# Patient Record
Sex: FEMALE | Race: WHITE | NOT HISPANIC OR LATINO
[De-identification: names, ages, dates, MRNs, and addresses within clinical notes are randomized per-mention and may not be internally consistent; named-entity substitution may affect disease eponyms.]

---

## 2017-08-01 ENCOUNTER — APPOINTMENT (OUTPATIENT)
Dept: THORACIC SURGERY | Facility: CLINIC | Age: 77
End: 2017-08-01
Payer: MEDICARE

## 2017-08-01 VITALS
DIASTOLIC BLOOD PRESSURE: 76 MMHG | RESPIRATION RATE: 16 BRPM | OXYGEN SATURATION: 98 % | BODY MASS INDEX: 28.7 KG/M2 | HEIGHT: 61 IN | WEIGHT: 152 LBS | SYSTOLIC BLOOD PRESSURE: 130 MMHG | HEART RATE: 68 BPM

## 2017-08-01 DIAGNOSIS — K21.9 GASTRO-ESOPHAGEAL REFLUX DISEASE W/OUT ESOPHAGITIS: ICD-10-CM

## 2017-08-01 DIAGNOSIS — Z87.898 PERSONAL HISTORY OF OTHER SPECIFIED CONDITIONS: ICD-10-CM

## 2017-08-01 DIAGNOSIS — Z87.2 PERSONAL HISTORY OF DISEASES OF THE SKIN AND SUBCUTANEOUS TISSUE: ICD-10-CM

## 2017-08-01 DIAGNOSIS — I10 ESSENTIAL (PRIMARY) HYPERTENSION: ICD-10-CM

## 2017-08-01 DIAGNOSIS — Z82.49 FAMILY HISTORY OF ISCHEMIC HEART DISEASE AND OTHER DISEASES OF THE CIRCULATORY SYSTEM: ICD-10-CM

## 2017-08-01 PROCEDURE — 99205 OFFICE O/P NEW HI 60 MIN: CPT

## 2017-08-07 ENCOUNTER — OUTPATIENT (OUTPATIENT)
Dept: OUTPATIENT SERVICES | Facility: HOSPITAL | Age: 77
LOS: 1 days | End: 2017-08-07
Payer: MEDICARE

## 2017-08-07 ENCOUNTER — APPOINTMENT (OUTPATIENT)
Dept: CT IMAGING | Facility: IMAGING CENTER | Age: 77
End: 2017-08-07
Payer: MEDICARE

## 2017-08-07 DIAGNOSIS — R91.8 OTHER NONSPECIFIC ABNORMAL FINDING OF LUNG FIELD: ICD-10-CM

## 2017-08-07 PROCEDURE — 71250 CT THORAX DX C-: CPT | Mod: 26

## 2017-08-07 PROCEDURE — 71250 CT THORAX DX C-: CPT

## 2017-08-09 ENCOUNTER — TRANSCRIPTION ENCOUNTER (OUTPATIENT)
Age: 77
End: 2017-08-09

## 2017-08-15 ENCOUNTER — APPOINTMENT (OUTPATIENT)
Dept: THORACIC SURGERY | Facility: CLINIC | Age: 77
End: 2017-08-15
Payer: MEDICARE

## 2017-08-15 DIAGNOSIS — R91.8 OTHER NONSPECIFIC ABNORMAL FINDING OF LUNG FIELD: ICD-10-CM

## 2017-08-15 PROCEDURE — 99214 OFFICE O/P EST MOD 30 MIN: CPT

## 2017-08-16 VITALS
DIASTOLIC BLOOD PRESSURE: 60 MMHG | SYSTOLIC BLOOD PRESSURE: 140 MMHG | BODY MASS INDEX: 28.32 KG/M2 | OXYGEN SATURATION: 98 % | HEART RATE: 57 BPM | WEIGHT: 150 LBS | RESPIRATION RATE: 14 BRPM | HEIGHT: 61 IN

## 2017-08-16 PROBLEM — R91.8 LUNG NODULES: Status: ACTIVE | Noted: 2017-08-01

## 2019-03-24 ENCOUNTER — EMERGENCY (EMERGENCY)
Facility: HOSPITAL | Age: 79
LOS: 1 days | Discharge: HOME | End: 2019-03-24
Attending: EMERGENCY MEDICINE | Admitting: EMERGENCY MEDICINE
Payer: MEDICARE

## 2019-03-24 ENCOUNTER — TRANSCRIPTION ENCOUNTER (OUTPATIENT)
Age: 79
End: 2019-03-24

## 2019-03-24 VITALS
RESPIRATION RATE: 16 BRPM | TEMPERATURE: 98 F | DIASTOLIC BLOOD PRESSURE: 64 MMHG | SYSTOLIC BLOOD PRESSURE: 158 MMHG | HEART RATE: 77 BPM | OXYGEN SATURATION: 99 %

## 2019-03-24 PROCEDURE — 99283 EMERGENCY DEPT VISIT LOW MDM: CPT

## 2019-03-24 NOTE — ED PROVIDER NOTE - GASTROINTESTINAL NEGATIVE STATEMENT, MLM
no abdominal pain, no bloating, no constipation, no diarrhea, no nausea and no vomiting. +small hemorrhoid with no thrombosed aspect

## 2019-03-24 NOTE — ED PROVIDER NOTE - OBJECTIVE STATEMENT
Pt brought in by EMS from [facility], pt states she had one episode of gas and when she went to the restroom she noticed scant blood on her underwear. Pt denies use of blood thinners, states she has been constipated over the past week. Pt denies chest pain, SOB, N/V/D, fever or chills. states  BRB blood coated stool, no weakness. states was straining and developed the blood coated stool . no headache, n/v/d, sob. fever. No Ac,

## 2019-03-24 NOTE — ED ADULT TRIAGE NOTE - CHIEF COMPLAINT QUOTE
Pt brought in by EMS from [facility], pt states she had one episode of gas and when she went to the restroom she noticed scant blood on her underwear. Pt denies use of blood thinners, states she has been constipated over the past week. Pt denies chest pain, SOB, N/V/D, fever or chills.

## 2019-03-25 ENCOUNTER — EMERGENCY (EMERGENCY)
Facility: HOSPITAL | Age: 79
LOS: 1 days | Discharge: HOME | End: 2019-03-25
Attending: EMERGENCY MEDICINE | Admitting: EMERGENCY MEDICINE
Payer: MEDICARE

## 2019-03-25 ENCOUNTER — TRANSCRIPTION ENCOUNTER (OUTPATIENT)
Age: 79
End: 2019-03-25

## 2019-03-25 VITALS
TEMPERATURE: 98 F | RESPIRATION RATE: 16 BRPM | OXYGEN SATURATION: 100 % | DIASTOLIC BLOOD PRESSURE: 86 MMHG | SYSTOLIC BLOOD PRESSURE: 150 MMHG | HEART RATE: 72 BPM

## 2019-03-25 DIAGNOSIS — Z90.710 ACQUIRED ABSENCE OF BOTH CERVIX AND UTERUS: Chronic | ICD-10-CM

## 2019-03-25 LAB
ALBUMIN SERPL ELPH-MCNC: 4.5 G/DL — SIGNIFICANT CHANGE UP (ref 3.3–5)
ALP SERPL-CCNC: 65 U/L — SIGNIFICANT CHANGE UP (ref 40–120)
ALT FLD-CCNC: 92 U/L — HIGH (ref 4–33)
ANION GAP SERPL CALC-SCNC: 13 MMO/L — SIGNIFICANT CHANGE UP (ref 7–14)
AST SERPL-CCNC: 84 U/L — HIGH (ref 4–32)
BASE EXCESS BLDV CALC-SCNC: 0.2 MMOL/L — SIGNIFICANT CHANGE UP
BASOPHILS # BLD AUTO: 0.03 K/UL — SIGNIFICANT CHANGE UP (ref 0–0.2)
BASOPHILS NFR BLD AUTO: 0.4 % — SIGNIFICANT CHANGE UP (ref 0–2)
BILIRUB SERPL-MCNC: 1.4 MG/DL — HIGH (ref 0.2–1.2)
BLOOD GAS VENOUS - CREATININE: 0.93 MG/DL — SIGNIFICANT CHANGE UP (ref 0.5–1.3)
BUN SERPL-MCNC: 27 MG/DL — HIGH (ref 7–23)
CALCIUM SERPL-MCNC: 10.5 MG/DL — SIGNIFICANT CHANGE UP (ref 8.4–10.5)
CHLORIDE BLDV-SCNC: 103 MMOL/L — SIGNIFICANT CHANGE UP (ref 96–108)
CHLORIDE SERPL-SCNC: 101 MMOL/L — SIGNIFICANT CHANGE UP (ref 98–107)
CO2 SERPL-SCNC: 23 MMOL/L — SIGNIFICANT CHANGE UP (ref 22–31)
CREAT SERPL-MCNC: 1.1 MG/DL — SIGNIFICANT CHANGE UP (ref 0.5–1.3)
EOSINOPHIL # BLD AUTO: 0.22 K/UL — SIGNIFICANT CHANGE UP (ref 0–0.5)
EOSINOPHIL NFR BLD AUTO: 2.6 % — SIGNIFICANT CHANGE UP (ref 0–6)
GAS PNL BLDV: 138 MMOL/L — SIGNIFICANT CHANGE UP (ref 136–146)
GLUCOSE BLDV-MCNC: 111 — HIGH (ref 70–99)
GLUCOSE SERPL-MCNC: 105 MG/DL — HIGH (ref 70–99)
HCO3 BLDV-SCNC: 23 MMOL/L — SIGNIFICANT CHANGE UP (ref 20–27)
HCT VFR BLD CALC: 39.9 % — SIGNIFICANT CHANGE UP (ref 34.5–45)
HCT VFR BLDV CALC: 41.4 % — SIGNIFICANT CHANGE UP (ref 34.5–45)
HGB BLD-MCNC: 13 G/DL — SIGNIFICANT CHANGE UP (ref 11.5–15.5)
HGB BLDV-MCNC: 13.5 G/DL — SIGNIFICANT CHANGE UP (ref 11.5–15.5)
IMM GRANULOCYTES NFR BLD AUTO: 0.9 % — SIGNIFICANT CHANGE UP (ref 0–1.5)
LACTATE BLDV-MCNC: 1.5 MMOL/L — SIGNIFICANT CHANGE UP (ref 0.5–2)
LIDOCAIN IGE QN: 52.1 U/L — SIGNIFICANT CHANGE UP (ref 7–60)
LYMPHOCYTES # BLD AUTO: 2.02 K/UL — SIGNIFICANT CHANGE UP (ref 1–3.3)
LYMPHOCYTES # BLD AUTO: 23.9 % — SIGNIFICANT CHANGE UP (ref 13–44)
MCHC RBC-ENTMCNC: 29.4 PG — SIGNIFICANT CHANGE UP (ref 27–34)
MCHC RBC-ENTMCNC: 32.6 % — SIGNIFICANT CHANGE UP (ref 32–36)
MCV RBC AUTO: 90.3 FL — SIGNIFICANT CHANGE UP (ref 80–100)
MONOCYTES # BLD AUTO: 0.59 K/UL — SIGNIFICANT CHANGE UP (ref 0–0.9)
MONOCYTES NFR BLD AUTO: 7 % — SIGNIFICANT CHANGE UP (ref 2–14)
NEUTROPHILS # BLD AUTO: 5.51 K/UL — SIGNIFICANT CHANGE UP (ref 1.8–7.4)
NEUTROPHILS NFR BLD AUTO: 65.2 % — SIGNIFICANT CHANGE UP (ref 43–77)
NRBC # FLD: 0 K/UL — SIGNIFICANT CHANGE UP (ref 0–0)
PCO2 BLDV: 46 MMHG — SIGNIFICANT CHANGE UP (ref 41–51)
PH BLDV: 7.36 PH — SIGNIFICANT CHANGE UP (ref 7.32–7.43)
PLATELET # BLD AUTO: 186 K/UL — SIGNIFICANT CHANGE UP (ref 150–400)
PMV BLD: 11.7 FL — SIGNIFICANT CHANGE UP (ref 7–13)
PO2 BLDV: 24 MMHG — LOW (ref 35–40)
POTASSIUM BLDV-SCNC: 4.2 MMOL/L — SIGNIFICANT CHANGE UP (ref 3.4–4.5)
POTASSIUM SERPL-MCNC: 4.2 MMOL/L — SIGNIFICANT CHANGE UP (ref 3.5–5.3)
POTASSIUM SERPL-SCNC: 4.2 MMOL/L — SIGNIFICANT CHANGE UP (ref 3.5–5.3)
PROT SERPL-MCNC: 7.6 G/DL — SIGNIFICANT CHANGE UP (ref 6–8.3)
RBC # BLD: 4.42 M/UL — SIGNIFICANT CHANGE UP (ref 3.8–5.2)
RBC # FLD: 13.7 % — SIGNIFICANT CHANGE UP (ref 10.3–14.5)
SAO2 % BLDV: 35.5 % — LOW (ref 60–85)
SODIUM SERPL-SCNC: 137 MMOL/L — SIGNIFICANT CHANGE UP (ref 135–145)
WBC # BLD: 8.45 K/UL — SIGNIFICANT CHANGE UP (ref 3.8–10.5)
WBC # FLD AUTO: 8.45 K/UL — SIGNIFICANT CHANGE UP (ref 3.8–10.5)

## 2019-03-25 PROCEDURE — 74176 CT ABD & PELVIS W/O CONTRAST: CPT | Mod: 26

## 2019-03-25 PROCEDURE — 99284 EMERGENCY DEPT VISIT MOD MDM: CPT

## 2019-03-25 RX ORDER — FAMOTIDINE 10 MG/ML
20 INJECTION INTRAVENOUS ONCE
Qty: 0 | Refills: 0 | Status: COMPLETED | OUTPATIENT
Start: 2019-03-25 | End: 2019-03-25

## 2019-03-25 RX ADMIN — Medication 30 MILLILITER(S): at 18:32

## 2019-03-25 NOTE — ED PROVIDER NOTE - NSFOLLOWUPINSTRUCTIONS_ED_ALL_ED_FT
Abdominal Pain    Many things can cause abdominal pain. Many times, abdominal pain is not caused by a disease and will improve without treatment. Your health care provider will do a physical exam to determine if there is a dangerous cause of your pain; blood tests and imaging may help determine the cause of your pain. However, in many cases, no cause may be found and you may need further testing as an outpatient. Monitor your abdominal pain for any changes.     SEEK IMMEDIATE MEDICAL CARE IF YOU HAVE ANY OF THE FOLLOWING SYMPTOMS: worsening abdominal pain, uncontrollable vomiting, profuse diarrhea, inability to have bowel movements or pass gas, black or bloody stools, fever accompanying chest pain or back pain, or fainting. These symptoms may represent a serious problem that is an emergency. Do not wait to see if the symptoms will go away. Get medical help right away. Call 911 and do not drive yourself to the hospital.    Please follow with your PMD in 2-3 days.

## 2019-03-25 NOTE — ED ADULT NURSE NOTE - OBJECTIVE STATEMENT
Pt c/o abd pain for years.  Was seen here yesterday for abd pain and bloody stool that was hemorrhoids.  Saw MD today and was sent here for CT.  Pt refusing IV contrast and pepcid.  Appears anxious and is paranoid that she will get meds she doesn't want.  Labs obtained and sent as ordered.  #20g SL R AC placed.  Kept NPO.  Emotional support given.  Awaiting CT

## 2019-03-25 NOTE — ED PROVIDER NOTE - OBJECTIVE STATEMENT
77 y/o F w/o Hx pw AP.  Pt notes 4 days of RLQ pain, mild, improved w/ movement.  On day PTA seen in ED 2/2 1 episode of bloody bowel movement.  No further episodes.  Went to PMD today, sent to ED for evaluation of pain.  Denies n/v, cp, SOB, fever, chills, d/c.

## 2019-03-25 NOTE — ED PROVIDER NOTE - PHYSICAL EXAMINATION
***GEN - NAD; well appearing; A+O x3   ***PULMONARY - CTA b/l, symmetric breath sounds. ***CARDIAC -s1s2, RRR, no M,G,R  ***ABDOMEN - ND, RLQ/RUQ TTP, soft, no guarding, no rebound, no alaina's   ***SKIN - no rash or bruising   ***NEUROLOGIC - alert and oriented, follows commands, sensation nl, motor nl, ***PSYCH - insight and judgment nl, memory nl, affect nl, thought nl

## 2019-03-25 NOTE — ED ADULT TRIAGE NOTE - CHIEF COMPLAINT QUOTE
c/o abdominal pain started this morning, no rectal bleeding noted today  but was seen here yesterday for rectal bleeding, pt went to PCP today and was sent for further eval/CT scan PMH: rt hip replacement, total hysterectomy, HLD, HTN

## 2019-03-25 NOTE — ED PROVIDER NOTE - PROGRESS NOTE DETAILS
pt well appearing, CT w/o acute pathology, again explained to pt limitations of no contrast.  Understands risks.  Will f/u w/ PMD.  stable for dc.

## 2019-03-25 NOTE — ED PROVIDER NOTE - CLINICAL SUMMARY MEDICAL DECISION MAKING FREE TEXT BOX
pt w/ RLQ pain, eval w/ CT/labs.  Pt refusing IV or PO contrast or IV in ED.  understands risks of missed dx due to lack of contrast including death.  Will proceed w/ pts wishes of non-con CT, butterfly for labs

## 2020-03-26 ENCOUNTER — INPATIENT (INPATIENT)
Facility: HOSPITAL | Age: 80
LOS: 6 days | Discharge: HOME | DRG: 535 | End: 2020-04-02
Attending: INTERNAL MEDICINE | Admitting: HOSPITALIST
Payer: MEDICARE

## 2020-03-26 VITALS
RESPIRATION RATE: 20 BRPM | HEART RATE: 106 BPM | DIASTOLIC BLOOD PRESSURE: 81 MMHG | OXYGEN SATURATION: 95 % | SYSTOLIC BLOOD PRESSURE: 133 MMHG | TEMPERATURE: 103 F
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
## 2020-03-26 DIAGNOSIS — Z90.710 ACQUIRED ABSENCE OF BOTH CERVIX AND UTERUS: Chronic | ICD-10-CM

## 2020-03-26 LAB
ALBUMIN SERPL ELPH-MCNC: 4.5 G/DL — SIGNIFICANT CHANGE UP (ref 3.3–5)
ALP SERPL-CCNC: 57 U/L — SIGNIFICANT CHANGE UP (ref 40–120)
ALT FLD-CCNC: 43 U/L — SIGNIFICANT CHANGE UP (ref 10–45)
ANION GAP SERPL CALC-SCNC: 17 MMOL/L — SIGNIFICANT CHANGE UP (ref 5–17)
APPEARANCE UR: CLEAR — SIGNIFICANT CHANGE UP
APTT BLD: 35.6 SEC — SIGNIFICANT CHANGE UP (ref 27.5–36.3)
AST SERPL-CCNC: 40 U/L — SIGNIFICANT CHANGE UP (ref 10–40)
BACTERIA # UR AUTO: NEGATIVE — SIGNIFICANT CHANGE UP
BASE EXCESS BLDV CALC-SCNC: 0.4 MMOL/L — SIGNIFICANT CHANGE UP (ref -2–2)
BASOPHILS # BLD AUTO: 0 K/UL — SIGNIFICANT CHANGE UP (ref 0–0.2)
BASOPHILS NFR BLD AUTO: 0 % — SIGNIFICANT CHANGE UP (ref 0–2)
BILIRUB SERPL-MCNC: 1.3 MG/DL — HIGH (ref 0.2–1.2)
BILIRUB UR-MCNC: NEGATIVE — SIGNIFICANT CHANGE UP
BLD GP AB SCN SERPL QL: NEGATIVE — SIGNIFICANT CHANGE UP
BUN SERPL-MCNC: 17 MG/DL — SIGNIFICANT CHANGE UP (ref 7–23)
CA-I SERPL-SCNC: 1.24 MMOL/L — SIGNIFICANT CHANGE UP (ref 1.12–1.3)
CALCIUM SERPL-MCNC: 10.4 MG/DL — SIGNIFICANT CHANGE UP (ref 8.4–10.5)
CHLORIDE BLDV-SCNC: 103 MMOL/L — SIGNIFICANT CHANGE UP (ref 96–108)
CHLORIDE SERPL-SCNC: 95 MMOL/L — LOW (ref 96–108)
CO2 BLDV-SCNC: 26 MMOL/L — SIGNIFICANT CHANGE UP (ref 22–30)
CO2 SERPL-SCNC: 23 MMOL/L — SIGNIFICANT CHANGE UP (ref 22–31)
COLOR SPEC: YELLOW — SIGNIFICANT CHANGE UP
CREAT SERPL-MCNC: 1.1 MG/DL — SIGNIFICANT CHANGE UP (ref 0.5–1.3)
DIFF PNL FLD: NEGATIVE — SIGNIFICANT CHANGE UP
EOSINOPHIL # BLD AUTO: 0.07 K/UL — SIGNIFICANT CHANGE UP (ref 0–0.5)
EOSINOPHIL NFR BLD AUTO: 0.9 % — SIGNIFICANT CHANGE UP (ref 0–6)
EPI CELLS # UR: 1 /HPF — SIGNIFICANT CHANGE UP
GAS PNL BLDV: 134 MMOL/L — LOW (ref 135–145)
GAS PNL BLDV: SIGNIFICANT CHANGE UP
GAS PNL BLDV: SIGNIFICANT CHANGE UP
GLUCOSE BLDV-MCNC: 139 MG/DL — HIGH (ref 70–99)
GLUCOSE SERPL-MCNC: 140 MG/DL — HIGH (ref 70–99)
GLUCOSE UR QL: NEGATIVE — SIGNIFICANT CHANGE UP
HCO3 BLDV-SCNC: 25 MMOL/L — SIGNIFICANT CHANGE UP (ref 21–29)
HCT VFR BLD CALC: 38.7 % — SIGNIFICANT CHANGE UP (ref 34.5–45)
HCT VFR BLDA CALC: 37 % — LOW (ref 39–50)
HGB BLD CALC-MCNC: 11.9 G/DL — SIGNIFICANT CHANGE UP (ref 11.5–15.5)
HGB BLD-MCNC: 12.7 G/DL — SIGNIFICANT CHANGE UP (ref 11.5–15.5)
HYALINE CASTS # UR AUTO: 1 /LPF — SIGNIFICANT CHANGE UP (ref 0–2)
INR BLD: 1.13 RATIO — SIGNIFICANT CHANGE UP (ref 0.88–1.16)
KETONES UR-MCNC: SIGNIFICANT CHANGE UP
LACTATE BLDV-MCNC: 1.7 MMOL/L — SIGNIFICANT CHANGE UP (ref 0.7–2)
LEUKOCYTE ESTERASE UR-ACNC: NEGATIVE — SIGNIFICANT CHANGE UP
LIDOCAIN IGE QN: 36 U/L — SIGNIFICANT CHANGE UP (ref 7–60)
LYMPHOCYTES # BLD AUTO: 0.54 K/UL — LOW (ref 1–3.3)
LYMPHOCYTES # BLD AUTO: 7 % — LOW (ref 13–44)
MANUAL SMEAR VERIFICATION: SIGNIFICANT CHANGE UP
MCHC RBC-ENTMCNC: 30.7 PG — SIGNIFICANT CHANGE UP (ref 27–34)
MCHC RBC-ENTMCNC: 32.8 GM/DL — SIGNIFICANT CHANGE UP (ref 32–36)
MCV RBC AUTO: 93.5 FL — SIGNIFICANT CHANGE UP (ref 80–100)
MONOCYTES # BLD AUTO: 0.41 K/UL — SIGNIFICANT CHANGE UP (ref 0–0.9)
MONOCYTES NFR BLD AUTO: 5.3 % — SIGNIFICANT CHANGE UP (ref 2–14)
NEUTROPHILS # BLD AUTO: 6.73 K/UL — SIGNIFICANT CHANGE UP (ref 1.8–7.4)
NEUTROPHILS NFR BLD AUTO: 82.4 % — HIGH (ref 43–77)
NEUTS BAND # BLD: 4.4 % — SIGNIFICANT CHANGE UP (ref 0–8)
NITRITE UR-MCNC: NEGATIVE — SIGNIFICANT CHANGE UP
PCO2 BLDV: 40 MMHG — SIGNIFICANT CHANGE UP (ref 35–50)
PH BLDV: 7.4 — SIGNIFICANT CHANGE UP (ref 7.35–7.45)
PH UR: 6.5 — SIGNIFICANT CHANGE UP (ref 5–8)
PLAT MORPH BLD: NORMAL — SIGNIFICANT CHANGE UP
PLATELET # BLD AUTO: 143 K/UL — LOW (ref 150–400)
PO2 BLDV: 26 MMHG — SIGNIFICANT CHANGE UP (ref 25–45)
POIKILOCYTOSIS BLD QL AUTO: SLIGHT — SIGNIFICANT CHANGE UP
POTASSIUM BLDV-SCNC: 3.9 MMOL/L — SIGNIFICANT CHANGE UP (ref 3.5–5.3)
POTASSIUM SERPL-MCNC: 4.1 MMOL/L — SIGNIFICANT CHANGE UP (ref 3.5–5.3)
POTASSIUM SERPL-SCNC: 4.1 MMOL/L — SIGNIFICANT CHANGE UP (ref 3.5–5.3)
PROT SERPL-MCNC: 7.6 G/DL — SIGNIFICANT CHANGE UP (ref 6–8.3)
PROT UR-MCNC: ABNORMAL
PROTHROM AB SERPL-ACNC: 13.1 SEC — HIGH (ref 10–12.9)
RBC # BLD: 4.14 M/UL — SIGNIFICANT CHANGE UP (ref 3.8–5.2)
RBC # FLD: 13.4 % — SIGNIFICANT CHANGE UP (ref 10.3–14.5)
RBC BLD AUTO: SIGNIFICANT CHANGE UP
RBC CASTS # UR COMP ASSIST: 1 /HPF — SIGNIFICANT CHANGE UP (ref 0–4)
RH IG SCN BLD-IMP: POSITIVE — SIGNIFICANT CHANGE UP
SAO2 % BLDV: 37 % — LOW (ref 67–88)
SARS-COV-2 RNA SPEC QL NAA+PROBE: SIGNIFICANT CHANGE UP
SODIUM SERPL-SCNC: 135 MMOL/L — SIGNIFICANT CHANGE UP (ref 135–145)
SP GR SPEC: 1.02 — SIGNIFICANT CHANGE UP (ref 1.01–1.02)
UROBILINOGEN FLD QL: NEGATIVE — SIGNIFICANT CHANGE UP
WBC # BLD: 7.75 K/UL — SIGNIFICANT CHANGE UP (ref 3.8–10.5)
WBC # FLD AUTO: 7.75 K/UL — SIGNIFICANT CHANGE UP (ref 3.8–10.5)
WBC UR QL: 0 /HPF — SIGNIFICANT CHANGE UP (ref 0–5)

## 2020-03-26 PROCEDURE — 73030 X-RAY EXAM OF SHOULDER: CPT | Mod: 26,LT

## 2020-03-26 PROCEDURE — 73502 X-RAY EXAM HIP UNI 2-3 VIEWS: CPT | Mod: 26,LT

## 2020-03-26 PROCEDURE — 73562 X-RAY EXAM OF KNEE 3: CPT | Mod: 26,LT

## 2020-03-26 PROCEDURE — 73552 X-RAY EXAM OF FEMUR 2/>: CPT | Mod: 26,LT

## 2020-03-26 PROCEDURE — 93010 ELECTROCARDIOGRAM REPORT: CPT

## 2020-03-26 PROCEDURE — 73090 X-RAY EXAM OF FOREARM: CPT | Mod: 26,LT

## 2020-03-26 PROCEDURE — 99285 EMERGENCY DEPT VISIT HI MDM: CPT

## 2020-03-26 PROCEDURE — 71045 X-RAY EXAM CHEST 1 VIEW: CPT | Mod: 26

## 2020-03-26 PROCEDURE — 73060 X-RAY EXAM OF HUMERUS: CPT | Mod: 26,LT

## 2020-03-26 RX ORDER — SODIUM CHLORIDE 9 MG/ML
1500 INJECTION INTRAMUSCULAR; INTRAVENOUS; SUBCUTANEOUS ONCE
Refills: 0 | Status: COMPLETED | OUTPATIENT
Start: 2020-03-26 | End: 2020-03-26

## 2020-03-26 RX ORDER — MORPHINE SULFATE 50 MG/1
4 CAPSULE, EXTENDED RELEASE ORAL ONCE
Refills: 0 | Status: DISCONTINUED | OUTPATIENT
Start: 2020-03-26 | End: 2020-03-26

## 2020-03-26 RX ORDER — CEFTRIAXONE 500 MG/1
1000 INJECTION, POWDER, FOR SOLUTION INTRAMUSCULAR; INTRAVENOUS ONCE
Refills: 0 | Status: COMPLETED | OUTPATIENT
Start: 2020-03-26 | End: 2020-03-26

## 2020-03-26 RX ORDER — ACETAMINOPHEN 500 MG
650 TABLET ORAL ONCE
Refills: 0 | Status: COMPLETED | OUTPATIENT
Start: 2020-03-26 | End: 2020-03-26

## 2020-03-26 RX ADMIN — Medication 650 MILLIGRAM(S): at 21:13

## 2020-03-26 RX ADMIN — CEFTRIAXONE 100 MILLIGRAM(S): 500 INJECTION, POWDER, FOR SOLUTION INTRAMUSCULAR; INTRAVENOUS at 21:06

## 2020-03-26 RX ADMIN — SODIUM CHLORIDE 1500 MILLILITER(S): 9 INJECTION INTRAMUSCULAR; INTRAVENOUS; SUBCUTANEOUS at 21:06

## 2020-03-26 RX ADMIN — SODIUM CHLORIDE 1500 MILLILITER(S): 9 INJECTION INTRAMUSCULAR; INTRAVENOUS; SUBCUTANEOUS at 23:07

## 2020-03-26 RX ADMIN — CEFTRIAXONE 1000 MILLIGRAM(S): 500 INJECTION, POWDER, FOR SOLUTION INTRAMUSCULAR; INTRAVENOUS at 23:07

## 2020-03-26 RX ADMIN — Medication 650 MILLIGRAM(S): at 23:06

## 2020-03-26 NOTE — ED PROVIDER NOTE - CLINICAL SUMMARY MEDICAL DECISION MAKING FREE TEXT BOX
78 yo F non-smoker, non-asthmatic with hx of HTN presents 2 days after a fall complaining of left hip pain for 2 days, found to be febrile on triage. , 102.9F. Ddx left hip fracture vs less likely dislocation. Unlikely shoulder fracture or dislocation. Fever might be secondary to pulmonary infection given cough for 1 month. Fall with no clear etiology; will get EKG and CXR. Will also evaluate for infectious etiologies, possibly COVID +. Although she's been immobile, unlikely PE given lack of SOB, or pleuritic chest pain. Will get basic labs, UA, UC, ches XR, EKG, Xrays of left shoulder and hip, and COVID testing given fever and likely admission. 78 yo F non-smoker, non-asthmatic with hx of HTN presents 2 days after a fall complaining of left hip pain for 2 days, found to be febrile on triage. , 102.9F. Ddx left hip fracture vs less likely dislocation. Unlikely shoulder fracture or dislocation. Fever might be secondary to pulmonary infection given cough for 1 month. Fall with no clear etiology; will get EKG and CXR. Will also evaluate for infectious etiologies, possibly COVID +. Although she's been immobile, unlikely PE given lack of SOB, or pleuritic chest pain. Will get basic labs, UA, UC, ches XR, EKG, Xrays of left shoulder and hip, and COVID testing given fever and likely admission.  caleb pt w mechanical fall 2 days ago nonambulatory since w lhip pain, pt slightly shorethened and ext rotatated, nvi, lue shoulder from no gross deformity, no loc or head trauma pt found to be febrile no obvious source of infection dry nonprod cough x 3-4 weeks will pox 96 will test for covid, ck ua -- vanessa will need admisssion for presumed hip fx

## 2020-03-26 NOTE — ED ADULT NURSE NOTE - ALCOHOL PRE SCREEN (AUDIT - C)
Problem: Goal Outcome Summary  Goal: Goal Outcome Summary  Outcome: Improving  A&O.  Afebrile.  VSS.  4 L NC.    Denies pain.  Tele Afib CVR, BBB.  , 90.  Dobutamine gtt 7.7 cc/hr.  [place] with good urine output.                 Statement Selected

## 2020-03-26 NOTE — ED PROVIDER NOTE - PHYSICAL EXAMINATION
Gen: AAOx3, non-toxic  Head: NCAT, no C-spine TTP.   HEENT: EOMI, oral mucosa moist, normal conjunctiva  Lung: CTAB, no respiratory distress, no wheezes/rhonchi/rales B/L, speaking in full sentences  CV: RRR, no murmurs, rubs or gallops  Abd: soft, NTND, no guarding, no CVA tenderness  MSK: Symmetrical leg length. left hip TTP with no ecchymosis, redness or warmth, with limited ROM. Normal distal sensation. Decreased lower extremities pulses blt. Left shoulder TTP with slightly limited ROM.   Neuro: No focal sensory or motor deficits, normal CN exam   Skin: Warm, well perfused, no rash  Psych: normal affect.

## 2020-03-26 NOTE — ED ADULT NURSE NOTE - OBJECTIVE STATEMENT
Pt is a 79y Female c/o L hip pain and L shoulder pain s/p fall 2 days ago. Pt states she was unable to get up out of bed since arriving home. Pt states she called EMS to help her get out of bed and after they helped her sit up she realized she was unable to walk. Pt was febrile upon arrival to the ED and denies cough, subjective fevers at home, or any other illness. Pt denies SOB, CP, dizziness at this time. Pt prefers to go by [place]. Pt resting comfortably in bed. Pt educated on call bell use and call bell placed at bedside. Pt safety maintained.

## 2020-03-26 NOTE — ED PROVIDER NOTE - NSCAREINITIATED _GEN_ER
No treatment.
No treatment.
Stable; no treatment.
Suspicious, umbilicated lid lesion on the right upper lid nasally discussed with patient. Refer to Dr. Tosha Pittman for evaluation and treatment.
Claude Vee(Attending)

## 2020-03-26 NOTE — ED ADULT NURSE NOTE - NSIMPLEMENTINTERV_GEN_ALL_ED
Implemented All Fall with Harm Risk Interventions:  Deeth to call system. Call bell, personal items and telephone within reach. Instruct patient to call for assistance. Room bathroom lighting operational. Non-slip footwear when patient is off stretcher. Physically safe environment: no spills, clutter or unnecessary equipment. Stretcher in lowest position, wheels locked, appropriate side rails in place. Provide visual cue, wrist band, yellow gown, etc. Monitor gait and stability. Monitor for mental status changes and reorient to person, place, and time. Review medications for side effects contributing to fall risk. Reinforce activity limits and safety measures with patient and family. Provide visual clues: red socks.

## 2020-03-26 NOTE — ED PROVIDER NOTE - PROGRESS NOTE DETAILS
xr of hip no obvoius fx, reviewed w radiology - pt not able to ambulate -- pain to hip with consult ortho for occult hip order ct and admit for r/o occult fx ---covid neg in ed images reviewed with ortho, who does not suspect fracture. Will get CT to look for occult fractures.

## 2020-03-26 NOTE — ED PROVIDER NOTE - CARE PLAN
Principal Discharge DX:	Fall at home, initial encounter Principal Discharge DX:	Fall at home, initial encounter  Secondary Diagnosis:	Inability to ambulate due to hip

## 2020-03-26 NOTE — ED PROVIDER NOTE - NS ED ROS FT
GENERAL: No fever or chills  EYES: no change in vision  HEENT: no trouble swallowing or speaking  CARDIAC: no chest pain or palpiations   PULMONARY: see hpi  GI: no abdominal pain, nausea, vomiting, diarrhea, or constipation   : No changes in urination  SKIN: no rashes  NEURO: no headache, numbness, or weakness.  MSK: see hpi

## 2020-03-26 NOTE — ED PROVIDER NOTE - OBJECTIVE STATEMENT
80 yo F non-smoker, non-asthmatic with hx of HTN presents 2 days after a fall complaining of left hip pain for 2 days. Reports being on her feet at home when she suddenly collapsed backwards 2 days ago. Denies chest pain, sob or palpitations prior to falling. Her  helped her go to bed and has been having worsening left hip pain since then and inability to ambulate. Denies numbness or tingling on lower extrm. Denies distal lower extrem weakness.  Was found to have fever on triage. Reports a cough for 1 month but no sob, chest pain, nasal congestion, anosmia, sick contacts or recent travel. Pt lives with  who is asymtomatic. Also reports left sided shoulder pain but reports normal mobility. Denies leg tenderness or redness, hx of DVTs or hemoptysis. 78 yo F non-smoker, non-asthmatic with hx of HTN presents 2 days after a fall complaining of left hip pain for 2 days. Reports being on her feet at home when she suddenly collapsed backwards 2 days ago. Denies chest pain, sob or palpitations prior to falling. Her  helped her go to bed and has been having worsening left hip pain since then and inability to ambulate. Denies numbness or tingling on lower extrm. Denies distal lower extrem weakness.  Was found to have fever on triage. Reports a cough for 1 month but no sob, chest pain, nasal congestion, anosmia, sick contacts or recent travel. Pt lives with  who is asymtomatic. Also reports left sided shoulder pain but reports normal mobility. Denies leg tenderness or redness, hx of DVTs or hemoptysis.  lindsey - [phone]

## 2020-03-26 NOTE — ED ADULT NURSE NOTE - NSFALLRSKINDICTYPE_ED_ALL_ED
Self Glucose Testin Hour Testing      Date   Fasting   After Breakfast   After  Lunch    After  Supper       75 67 115 114     73   103    10-30 90       12- 90 76 73 115    12-2 71 112 78 69    12-3 70 69 113 91    12-4 72 71 120 87    12-5 72 71 115 110    12-6 75 65 116 94    12-7 81 101        Current Regimen Diet controlled    
Need for Mobility Assisted Device

## 2020-03-27 DIAGNOSIS — I10 ESSENTIAL (PRIMARY) HYPERTENSION: ICD-10-CM

## 2020-03-27 DIAGNOSIS — R26.2 DIFFICULTY IN WALKING, NOT ELSEWHERE CLASSIFIED: ICD-10-CM

## 2020-03-27 DIAGNOSIS — M25.552 PAIN IN LEFT HIP: ICD-10-CM

## 2020-03-27 DIAGNOSIS — Z96.641 PRESENCE OF RIGHT ARTIFICIAL HIP JOINT: Chronic | ICD-10-CM

## 2020-03-27 DIAGNOSIS — R05 COUGH: ICD-10-CM

## 2020-03-27 DIAGNOSIS — R65.10 SYSTEMIC INFLAMMATORY RESPONSE SYNDROME (SIRS) OF NON-INFECTIOUS ORIGIN WITHOUT ACUTE ORGAN DYSFUNCTION: ICD-10-CM

## 2020-03-27 PROBLEM — Z78.9 OTHER SPECIFIED HEALTH STATUS: Chronic | Status: ACTIVE | Noted: 2019-03-25

## 2020-03-27 LAB
ALBUMIN SERPL ELPH-MCNC: 3.7 G/DL — SIGNIFICANT CHANGE UP (ref 3.3–5)
ALP SERPL-CCNC: 47 U/L — SIGNIFICANT CHANGE UP (ref 40–120)
ALT FLD-CCNC: 47 U/L — HIGH (ref 10–45)
ANION GAP SERPL CALC-SCNC: 12 MMOL/L — SIGNIFICANT CHANGE UP (ref 5–17)
APTT BLD: 34.6 SEC — SIGNIFICANT CHANGE UP (ref 27.5–36.3)
AST SERPL-CCNC: 52 U/L — HIGH (ref 10–40)
BASOPHILS # BLD AUTO: 0.01 K/UL — SIGNIFICANT CHANGE UP (ref 0–0.2)
BASOPHILS NFR BLD AUTO: 0.2 % — SIGNIFICANT CHANGE UP (ref 0–2)
BILIRUB SERPL-MCNC: 0.9 MG/DL — SIGNIFICANT CHANGE UP (ref 0.2–1.2)
BUN SERPL-MCNC: 17 MG/DL — SIGNIFICANT CHANGE UP (ref 7–23)
CALCIUM SERPL-MCNC: 9.2 MG/DL — SIGNIFICANT CHANGE UP (ref 8.4–10.5)
CHLORIDE SERPL-SCNC: 100 MMOL/L — SIGNIFICANT CHANGE UP (ref 96–108)
CO2 SERPL-SCNC: 23 MMOL/L — SIGNIFICANT CHANGE UP (ref 22–31)
CREAT SERPL-MCNC: 0.94 MG/DL — SIGNIFICANT CHANGE UP (ref 0.5–1.3)
CULTURE RESULTS: NO GROWTH — SIGNIFICANT CHANGE UP
EOSINOPHIL # BLD AUTO: 0.09 K/UL — SIGNIFICANT CHANGE UP (ref 0–0.5)
EOSINOPHIL NFR BLD AUTO: 1.5 % — SIGNIFICANT CHANGE UP (ref 0–6)
GLUCOSE SERPL-MCNC: 106 MG/DL — HIGH (ref 70–99)
HCT VFR BLD CALC: 35.5 % — SIGNIFICANT CHANGE UP (ref 34.5–45)
HGB BLD-MCNC: 11.4 G/DL — LOW (ref 11.5–15.5)
IMM GRANULOCYTES NFR BLD AUTO: 2.9 % — HIGH (ref 0–1.5)
INR BLD: 1.13 RATIO — SIGNIFICANT CHANGE UP (ref 0.88–1.16)
LYMPHOCYTES # BLD AUTO: 1.21 K/UL — SIGNIFICANT CHANGE UP (ref 1–3.3)
LYMPHOCYTES # BLD AUTO: 20.8 % — SIGNIFICANT CHANGE UP (ref 13–44)
MAGNESIUM SERPL-MCNC: 1.6 MG/DL — SIGNIFICANT CHANGE UP (ref 1.6–2.6)
MCHC RBC-ENTMCNC: 30.2 PG — SIGNIFICANT CHANGE UP (ref 27–34)
MCHC RBC-ENTMCNC: 32.1 GM/DL — SIGNIFICANT CHANGE UP (ref 32–36)
MCV RBC AUTO: 94.2 FL — SIGNIFICANT CHANGE UP (ref 80–100)
MONOCYTES # BLD AUTO: 0.66 K/UL — SIGNIFICANT CHANGE UP (ref 0–0.9)
MONOCYTES NFR BLD AUTO: 11.4 % — SIGNIFICANT CHANGE UP (ref 2–14)
NEUTROPHILS # BLD AUTO: 3.67 K/UL — SIGNIFICANT CHANGE UP (ref 1.8–7.4)
NEUTROPHILS NFR BLD AUTO: 63.2 % — SIGNIFICANT CHANGE UP (ref 43–77)
NRBC # BLD: 0 /100 WBCS — SIGNIFICANT CHANGE UP (ref 0–0)
PHOSPHATE SERPL-MCNC: 3.3 MG/DL — SIGNIFICANT CHANGE UP (ref 2.5–4.5)
PLATELET # BLD AUTO: 126 K/UL — LOW (ref 150–400)
POTASSIUM SERPL-MCNC: 4 MMOL/L — SIGNIFICANT CHANGE UP (ref 3.5–5.3)
POTASSIUM SERPL-SCNC: 4 MMOL/L — SIGNIFICANT CHANGE UP (ref 3.5–5.3)
PROT SERPL-MCNC: 6.4 G/DL — SIGNIFICANT CHANGE UP (ref 6–8.3)
PROTHROM AB SERPL-ACNC: 13 SEC — SIGNIFICANT CHANGE UP (ref 10–13.1)
RAPID RVP RESULT: SIGNIFICANT CHANGE UP
RBC # BLD: 3.77 M/UL — LOW (ref 3.8–5.2)
RBC # FLD: 13.3 % — SIGNIFICANT CHANGE UP (ref 10.3–14.5)
SARS-COV-2 RNA SPEC QL NAA+PROBE: SIGNIFICANT CHANGE UP
SODIUM SERPL-SCNC: 135 MMOL/L — SIGNIFICANT CHANGE UP (ref 135–145)
SPECIMEN SOURCE: SIGNIFICANT CHANGE UP
WBC # BLD: 5.81 K/UL — SIGNIFICANT CHANGE UP (ref 3.8–10.5)
WBC # FLD AUTO: 5.81 K/UL — SIGNIFICANT CHANGE UP (ref 3.8–10.5)

## 2020-03-27 PROCEDURE — 76377 3D RENDER W/INTRP POSTPROCES: CPT | Mod: 26

## 2020-03-27 PROCEDURE — 99221 1ST HOSP IP/OBS SF/LOW 40: CPT | Mod: GC

## 2020-03-27 PROCEDURE — 99223 1ST HOSP IP/OBS HIGH 75: CPT

## 2020-03-27 PROCEDURE — 73700 CT LOWER EXTREMITY W/O DYE: CPT | Mod: 26,LT

## 2020-03-27 PROCEDURE — 72192 CT PELVIS W/O DYE: CPT | Mod: 26

## 2020-03-27 PROCEDURE — 72190 X-RAY EXAM OF PELVIS: CPT | Mod: 26

## 2020-03-27 RX ORDER — SENNA PLUS 8.6 MG/1
2 TABLET ORAL AT BEDTIME
Refills: 0 | Status: DISCONTINUED | OUTPATIENT
Start: 2020-03-27 | End: 2020-04-02

## 2020-03-27 RX ORDER — ATENOLOL 25 MG/1
25 TABLET ORAL DAILY
Refills: 0 | Status: DISCONTINUED | OUTPATIENT
Start: 2020-03-27 | End: 2020-04-02

## 2020-03-27 RX ORDER — ZOLPIDEM TARTRATE 10 MG/1
5 TABLET ORAL AT BEDTIME
Refills: 0 | Status: DISCONTINUED | OUTPATIENT
Start: 2020-03-27 | End: 2020-03-27

## 2020-03-27 RX ORDER — POLYETHYLENE GLYCOL 3350 17 G/17G
17 POWDER, FOR SOLUTION ORAL DAILY
Refills: 0 | Status: DISCONTINUED | OUTPATIENT
Start: 2020-03-27 | End: 2020-04-02

## 2020-03-27 RX ORDER — LOSARTAN POTASSIUM 100 MG/1
25 TABLET, FILM COATED ORAL DAILY
Refills: 0 | Status: DISCONTINUED | OUTPATIENT
Start: 2020-03-27 | End: 2020-03-27

## 2020-03-27 RX ORDER — OXYCODONE AND ACETAMINOPHEN 5; 325 MG/1; MG/1
1 TABLET ORAL EVERY 4 HOURS
Refills: 0 | Status: DISCONTINUED | OUTPATIENT
Start: 2020-03-27 | End: 2020-03-27

## 2020-03-27 RX ORDER — ACETAMINOPHEN WITH CODEINE 300MG-30MG
1 TABLET ORAL EVERY 4 HOURS
Refills: 0 | Status: DISCONTINUED | OUTPATIENT
Start: 2020-03-27 | End: 2020-03-27

## 2020-03-27 RX ORDER — OXYCODONE HYDROCHLORIDE 5 MG/1
5 TABLET ORAL EVERY 6 HOURS
Refills: 0 | Status: DISCONTINUED | OUTPATIENT
Start: 2020-03-27 | End: 2020-04-02

## 2020-03-27 RX ORDER — LORATADINE 10 MG/1
10 TABLET ORAL AT BEDTIME
Refills: 0 | Status: DISCONTINUED | OUTPATIENT
Start: 2020-03-27 | End: 2020-04-02

## 2020-03-27 RX ORDER — ENOXAPARIN SODIUM 100 MG/ML
40 INJECTION SUBCUTANEOUS DAILY
Refills: 0 | Status: DISCONTINUED | OUTPATIENT
Start: 2020-03-27 | End: 2020-04-02

## 2020-03-27 RX ORDER — ACETAMINOPHEN 500 MG
650 TABLET ORAL EVERY 8 HOURS
Refills: 0 | Status: DISCONTINUED | OUTPATIENT
Start: 2020-03-27 | End: 2020-04-02

## 2020-03-27 RX ADMIN — LOSARTAN POTASSIUM 25 MILLIGRAM(S): 100 TABLET, FILM COATED ORAL at 05:36

## 2020-03-27 RX ADMIN — Medication 1 TABLET(S): at 08:38

## 2020-03-27 RX ADMIN — ENOXAPARIN SODIUM 40 MILLIGRAM(S): 100 INJECTION SUBCUTANEOUS at 07:25

## 2020-03-27 RX ADMIN — Medication 1 TABLET(S): at 07:25

## 2020-03-27 RX ADMIN — ATENOLOL 25 MILLIGRAM(S): 25 TABLET ORAL at 07:25

## 2020-03-27 RX ADMIN — POLYETHYLENE GLYCOL 3350 17 GRAM(S): 17 POWDER, FOR SOLUTION ORAL at 07:25

## 2020-03-27 NOTE — H&P ADULT - HISTORY OF PRESENT ILLNESS
79F c hx HTN, right hip replacement, pw 1 month of dry cough, and fall c/b left hip pain and inability to ambulate.    Pt states she has had a dry cough for about 1 month, but was otherwise in her usual state of health until 3 days ago, when she was walking in her home and suddenly fell backwards. Pt isn't sure what caused the fall, but pt denies any pre-syncope and LOC at the time. Pt states she fell onto her left hip, and pt immediately called out to her , who helped pt to the bed. Pt states she was unable to get up on her own at that time and has not been able to ambulate since then. Pt denies head trauma. Pt states she did not know she had a fever until arriving in the Ed, where she noticed she was also having chills. Other ROS as below.    VS: Tm 102.9, P 106, /56, R 20, 95% RA  In the Ed, received ceftriaxone, 1.5L, tylenol

## 2020-03-27 NOTE — H&P ADULT - NSHPPHYSICALEXAM_GEN_ALL_CORE
PHYSICAL EXAM:   GENERAL: Alert. Not confused. No acute distress. Not thin. Not cachectic. Not obese.  HEAD:  Atraumatic. Normocephalic.  EYES: EOMI. PERRLA. Normal conjunctiva/sclera.  ENT: Neck supple. No JVD. Moist oral mucosa. Not edentulous. No thrush.  LYMPH: Normal supraclavicular/cervical lymph nodes.   CARDIAC: Not tachy, Not fredrick. Regular rhythm. Not irregularly irregular. S1. S2. No murmur. No rub. No distant heart sounds.  LUNG/CHEST: CTAB. BS equal bilaterally. No wheezes. No rales. No rhonchi.  ABDOMEN: Soft. No tenderness. No distension. No fluid wave. Normal bowel sounds.  BACK: No midline/vertebral tenderness. No flank tenderness.  VASCULAR: +2 b/l radial or ulnar pulses. Palpable DP pulses.  EXTREMITIES:  No clubbing. No cyanosis. No edema. +tenderness to palpation of left greater trochanter  NEUROLOGY: A&Ox3. Non-focal exam. Cranial nerves intact. Normal speech. Sensation intact.  PSYCH: Normal behavior. Normal affect.  SKIN: No jaundice. No erythema. No rash/lesion.  Vascular Access:     ICU Vital Signs Last 24 Hrs  T(C): 37.8 (27 Mar 2020 01:38), Max: 39.4 (26 Mar 2020 19:13)  T(F): 100 (27 Mar 2020 01:38), Max: 102.9 (26 Mar 2020 19:13)  HR: 87 (27 Mar 2020 01:38) (87 - 106)  BP: 101/56 (27 Mar 2020 01:38) (101/56 - 133/81)  BP(mean): 77 (26 Mar 2020 23:08) (77 - 77)  ABP: --  ABP(mean): --  RR: 20 (27 Mar 2020 01:38) (20 - 20)  SpO2: 97% (27 Mar 2020 01:38) (95% - 97%)      I&O's Summary

## 2020-03-27 NOTE — CONSULT NOTE ADULT - SUBJECTIVE AND OBJECTIVE BOX
HPI:  78 yo F w/ PMHx of HTN, BL varicose veins, R SHERI (Dr. Harrell, '17) who was admitted for fever workup which she was found to have after a mechanical fall at home. Pt states she was in her home when she lost her balance and fell backwards and landed on her left side. She states she was unable to get up on her own from the shock of the fall and felt disoriented. Denies numbness or tingling. States she also fall onto her left arm. Denies other injuries. Denies head trauma, LOC. Ambulates with a walker/independently at times. Denies cp, sob, n/v.     PAST MEDICAL & SURGICAL HISTORY:  HTN (hypertension)  No pertinent past medical history  History of right hip replacement  S/P PING (total abdominal hysterectomy)    Home Medications:  Allegra 180 mg oral tablet: 1 tab(s) orally once a day (at bedtime) (27 Mar 2020 09:10)  Ambien 5 mg oral tablet: 0.5 to 1 tab(s) orally once a day (at bedtime) (27 Mar 2020 09:10)  atenolol 25 mg oral tablet: 1 tab(s) orally once a day (27 Mar 2020 09:10)  Cozaar 50 mg oral tablet: 1 tab(s) orally once a day (27 Mar 2020 09:10)  hydroCHLOROthiazide 25 mg oral tablet: 1 tab(s) orally once a day (27 Mar 2020 09:10)    Allergies    Cipro (Nausea)  contrast media (gadolinium-based) (Faint)  Minocin (Nausea)    Intolerances    Vital Signs Last 24 Hrs  T(C): 37.9 (27 Mar 2020 11:40), Max: 39.4 (26 Mar 2020 19:13)  T(F): 100.2 (27 Mar 2020 11:40), Max: 102.9 (26 Mar 2020 19:13)  HR: 76 (27 Mar 2020 11:40) (69 - 106)  BP: 130/51 (27 Mar 2020 11:40) (101/56 - 133/81)  BP(mean): 77 (26 Mar 2020 23:08) (77 - 77)  RR: 19 (27 Mar 2020 11:40) (19 - 20)  SpO2: 98% (27 Mar 2020 11:40) (95% - 99%)    PE:  Gen: NAD  LLE:  Skin intact, no erythema, ecchymoses or warmth  Varicose veins present, tender to palpation  Compartments soft and compressible  No calf ttp  +ttp over the left lateral buttocks  +ttp in left groin  No ttp over bony prominences of left knee/tibia  +EHL/FHL/Gsc/TA  SILT L2-S1  2+ DP    Secondary Survey:  No head trauma  No ttp along c/t/l/s spine  Mild ttp along left arm, improving per patient  No ttp along bony prominences diffusely, other than mentioned above  +A/PROM intact in all joints diffusely, other than mentioned above  SILT/NVI diffusely  Compartments soft and compressible diffusely    CT HPI:  78 yo F w/ PMHx of HTN, BL varicose veins, R SHERI (Dr. Harrell, '17) who was admitted for fever workup which she was found to have after a mechanical fall at home. Pt states she was in her home when she lost her balance and fell backwards and landed on her left side. She states she was unable to get up on her own from the shock of the fall and felt disoriented. Denies numbness or tingling. States she also fall onto her left arm. Denies other injuries. Denies head trauma, LOC. Ambulates with a walker/independently at times. Denies cp, sob, n/v.     PAST MEDICAL & SURGICAL HISTORY:  HTN (hypertension)  No pertinent past medical history  History of right hip replacement  S/P PING (total abdominal hysterectomy)    Home Medications:  Allegra 180 mg oral tablet: 1 tab(s) orally once a day (at bedtime) (27 Mar 2020 09:10)  Ambien 5 mg oral tablet: 0.5 to 1 tab(s) orally once a day (at bedtime) (27 Mar 2020 09:10)  atenolol 25 mg oral tablet: 1 tab(s) orally once a day (27 Mar 2020 09:10)  Cozaar 50 mg oral tablet: 1 tab(s) orally once a day (27 Mar 2020 09:10)  hydroCHLOROthiazide 25 mg oral tablet: 1 tab(s) orally once a day (27 Mar 2020 09:10)    Allergies    Cipro (Nausea)  contrast media (gadolinium-based) (Faint)  Minocin (Nausea)    Intolerances    Vital Signs Last 24 Hrs  T(C): 37.9 (27 Mar 2020 11:40), Max: 39.4 (26 Mar 2020 19:13)  T(F): 100.2 (27 Mar 2020 11:40), Max: 102.9 (26 Mar 2020 19:13)  HR: 76 (27 Mar 2020 11:40) (69 - 106)  BP: 130/51 (27 Mar 2020 11:40) (101/56 - 133/81)  BP(mean): 77 (26 Mar 2020 23:08) (77 - 77)  RR: 19 (27 Mar 2020 11:40) (19 - 20)  SpO2: 98% (27 Mar 2020 11:40) (95% - 99%)    PE:  Gen: NAD  LLE:  Skin intact, no erythema, ecchymoses or warmth  Varicose veins present, tender to palpation  Compartments soft and compressible  No calf ttp  +ttp over the left lateral buttocks  +ttp in left groin  No ttp over bony prominences of left knee/tibia  +EHL/FHL/Gsc/TA  SILT L2-S1  2+ DP    Secondary Survey:  No head trauma  No ttp along c/t/l/s spine  Mild ttp along left arm, improving per patient  No ttp along bony prominences diffusely, other than mentioned above  +A/PROM intact in all joints diffusely, other than mentioned above  SILT/NVI diffusely  Compartments soft and compressible diffusely    CT Pelvis: Left inferior pubic rami fracture, left anterior column fracture  CT left knee: pending official read

## 2020-03-27 NOTE — H&P ADULT - NSHPREVIEWOFSYSTEMS_GEN_ALL_CORE
REVIEW OF SYSTEMS:  CONSTITUTIONAL: No weakness. +fevers. +chills. No rigors. No weight loss. No night sweats. No poor appetite.  EYES: No visual changes. No eye pain.  ENT: No hearing difficulty. No vertigo. No dysphagia. No sore throat. No Sinusitis/rhinorrhea.   NECK: No pain. No stiffness/rigidity.  CARDIAC: No chest pain. No palpitations. No lightheadedness.  RESPIRATORY: +cough. No SOB. No hemoptysis.  GASTROINTESTINAL: No abdominal pain. No nausea. No vomiting. No hematemesis. No diarrhea. No constipation. No melena. No hematochezia.  GENITOURINARY: No dysuria. No frequency. No hesitancy. No hematuria. No oliguria.  NEUROLOGICAL: No numbness/tingling. No focal weakness. No urinary or fecal incontinence. No headache. No unsteady gait.  BACK: No back pain. No flank pain.  EXTREMITIES: No lower extremity edema. +left hip pain.  SKIN: No rashes. No itching. No other lesions.  PSYCHIATRIC: No depression. No anxiety. No SI/HI.  ALLERGIC: No lip swelling. No hives.  All other review of systems is negative unless indicated above.  Unless indicated above, unable to assess ROS 2/2

## 2020-03-27 NOTE — CONSULT NOTE ADULT - SUBJECTIVE AND OBJECTIVE BOX
CHIEF COMPLAINT:Patient is a 79y old  Female who presents with a chief complaint of left hip pain, fall (27 Mar 2020 02:44)      HPI:  79F c hx HTN, right hip replacement, pw 1 month of dry cough, and fall c/b left hip pain and inability to ambulate.  Pt states she has had a dry cough for about 1 month, but was otherwise in her usual state of health until 3 days ago, when she was walking in her home and suddenly fell backwards. Pt isn't sure what caused the fall, but pt denies any pre-syncope and LOC at the time. Pt states she fell onto her left hip, and pt immediately called out to her , who helped pt to the bed. Pt states she was unable to get up on her own at that time and has not been able to ambulate since then. Pt denies head trauma. Pt states she did not know she had a fever until arriving in the Ed, where she noticed she was also having chills. Other ROS as below.  pt with known hx of htn ?cad, s/p fall , ?syncope.  pt with no hx of arrythmia but is been  taking few meds for bp control, including HCTZ.  no chest pain.        PAST MEDICAL & SURGICAL HISTORY:  HTN (hypertension)  No pertinent past medical history  History of right hip replacement  S/P PING (total abdominal hysterectomy)      MEDICATIONS  (STANDING):  ATENolol  Tablet 25 milliGRAM(s) Oral daily  enoxaparin Injectable 40 milliGRAM(s) SubCutaneous daily  loratadine 10 milliGRAM(s) Oral at bedtime  losartan 25 milliGRAM(s) Oral daily  polyethylene glycol 3350 17 Gram(s) Oral daily  senna 2 Tablet(s) Oral at bedtime    MEDICATIONS  (PRN):  acetaminophen 300 mG/codeine 30 mG 1 Tablet(s) Oral every 4 hours PRN Moderate Pain (4 - 6)  oxycodone    5 mG/acetaminophen 325 mG 1 Tablet(s) Oral every 4 hours PRN Severe Pain (7 - 10)  zolpidem 5 milliGRAM(s) Oral at bedtime PRN Insomnia      FAMILY HISTORY:  No pertinent family history in first degree relatives      SOCIAL HISTORY:    [ ] Non-smoker  [ ] Smoker  [ ] Alcohol    Allergies    Cipro (Nausea)  contrast media (gadolinium-based) (Faint)  Minocin (Nausea)    Intolerances    	    REVIEW OF SYSTEMS:  CONSTITUTIONAL: No fever, weight loss, or fatigue  EYES: No eye pain, visual disturbances, or discharge  ENT:  No difficulty hearing, tinnitus, vertigo; No sinus or throat pain  NECK: No pain or stiffness  RESPIRATORY: No cough, wheezing, chills or hemoptysis; + Shortness of Breath  CARDIOVASCULAR: No chest pain, palpitations, passing out, dizziness, or leg swelling  GASTROINTESTINAL: No abdominal or epigastric pain. No nausea, vomiting, or hematemesis; No diarrhea or constipation. No melena or hematochezia.  GENITOURINARY: No dysuria, frequency, hematuria, or incontinence  NEUROLOGICAL: No headaches, memory loss, loss of strength, numbness, or tremors  SKIN: No itching, burning, rashes, or lesions   LYMPH Nodes: No enlarged glands  ENDOCRINE: No heat or cold intolerance; No hair loss  MUSCULOSKELETAL: No joint pain or swelling; No muscle, back, or extremity pain  PSYCHIATRIC: No depression, anxiety, mood swings, or difficulty sleeping  HEME/LYMPH: No easy bruising, or bleeding gums  ALLERGY AND IMMUNOLOGIC: No hives or eczema	    [ ] All others negative	  [ ] Unable to obtain    PHYSICAL EXAM:  T(C): 36.8 (03-27-20 @ 07:23), Max: 39.4 (03-26-20 @ 19:13)  HR: 69 (03-27-20 @ 07:23) (69 - 106)  BP: 119/55 (03-27-20 @ 07:23) (101/56 - 133/81)  RR: 20 (03-27-20 @ 07:23) (20 - 20)  SpO2: 99% (03-27-20 @ 07:23) (95% - 99%)  Wt(kg): --  I&O's Summary      Appearance: Normal	  HEENT:   Normal oral mucosa, PERRL, EOMI	  Lymphatic: No lymphadenopathy  Cardiovascular: Normal S1 S2, No JVD, + murmurs, No edema  Respiratory: rhonchi  Psychiatry: A & O x 3, Mood & affect appropriate  Gastrointestinal:  Soft, Non-tender, + BS	  Skin: No rashes, No ecchymoses, No cyanosis	  Neurologic: Non-focal  Extremities: Normal range of motion, No clubbing, cyanosis or edema  Vascular: Peripheral pulses palpable 2+ bilaterally    TELEMETRY: 	    ECG:  	  RADIOLOGY:  OTHER: 	  	  LABS:	 	    CARDIAC MARKERS:                              11.4   5.81  )-----------( 126      ( 27 Mar 2020 07:02 )             35.5     03-27    135  |  100  |  17  ----------------------------<  106<H>  4.0   |  23  |  0.94    Ca    9.2      27 Mar 2020 07:02  Phos  3.3     03-27  Mg     1.6     03-27    TPro  6.4  /  Alb  3.7  /  TBili  0.9  /  DBili  x   /  AST  52<H>  /  ALT  47<H>  /  AlkPhos  47  03-27    proBNP:   Lipid Profile:   HgA1c:   TSH:   PT/INR - ( 27 Mar 2020 08:34 )   PT: 13.0 sec;   INR: 1.13 ratio         PTT - ( 27 Mar 2020 08:34 )  PTT:34.6 sec    PREVIOUS DIAGNOSTIC TESTING:    < from: Xray Chest 1 View AP/PA (03.26.20 @ 22:23) >  INTERPRETATION:  CLINICAL INFORMATION: Pain status post fall.    TECHNIQUE: AP view of the chest.    COMPARISON: CT chest from 8/7/2017.    FINDINGS:    No acute displaced fracture. No pneumothorax or pleural effusion. Lungs are clear. Mediastinal silhouette is normal.    IMPRESSION:     Clear lungs.    < from: CT Pelvis Bony Only No Cont (03.27.20 @ 00:23) >  1. Acute, nondisplaced fracture of the anterior left acetabulum.  2. Acute, nondisplaced of the left inferior pubic ramus.

## 2020-03-27 NOTE — PROGRESS NOTE ADULT - SUBJECTIVE AND OBJECTIVE BOX
temp of 100  REVIEW OF SYSTEMS:  GEN: fever,    no chills  RESP: no SOB,   no cough  CVS: no chest pain,   no palpitations  GI: no abdominal pain,   no nausea,   no vomiting,   no constipation,   no diarrhea  : no dysuria,   no frequency  NEURO: no headache,   no dizziness  PSYCH: no depression,   not anxious  Derm : no rash    MEDICATIONS  (STANDING):  ATENolol  Tablet 25 milliGRAM(s) Oral daily  enoxaparin Injectable 40 milliGRAM(s) SubCutaneous daily  loratadine 10 milliGRAM(s) Oral at bedtime  polyethylene glycol 3350 17 Gram(s) Oral daily  senna 2 Tablet(s) Oral at bedtime    MEDICATIONS  (PRN):  acetaminophen   Tablet .. 650 milliGRAM(s) Oral every 8 hours PRN Mild Pain (1 - 3)  oxyCODONE    IR 5 milliGRAM(s) Oral every 6 hours PRN Moderate Pain (4 - 6)      Vital Signs Last 24 Hrs  T(C): 36.8 (27 Mar 2020 07:23), Max: 39.4 (26 Mar 2020 19:13)  T(F): 98.3 (27 Mar 2020 07:23), Max: 102.9 (26 Mar 2020 19:13)  HR: 69 (27 Mar 2020 07:23) (69 - 106)  BP: 119/55 (27 Mar 2020 07:23) (101/56 - 133/81)  BP(mean): 77 (26 Mar 2020 23:08) (77 - 77)  RR: 20 (27 Mar 2020 07:23) (20 - 20)  SpO2: 99% (27 Mar 2020 07:23) (95% - 99%)  CAPILLARY BLOOD GLUCOSE        I&O's Summary      PHYSICAL EXAM:  HEAD:  Atraumatic, Normocephalic  NECK: Supple, No   JVD  CHEST/LUNG:   no     rales,     no,    rhonchi  HEART: Regular rate and rhythm;         murmur  ABDOMEN: Soft, Nontender, ;   EXTREMITIES:     no   edema  NEUROLOGY:  alert    LABS:                        11.4   5.81  )-----------( 126      ( 27 Mar 2020 07:02 )             35.5     -    135  |  100  |  17  ----------------------------<  106<H>  4.0   |  23  |  0.94    Ca    9.2      27 Mar 2020 07:02  Phos  3.3       Mg     1.6         TPro  6.4  /  Alb  3.7  /  TBili  0.9  /  DBili  x   /  AST  52<H>  /  ALT  47<H>  /  AlkPhos  47      PT/INR - ( 27 Mar 2020 08:34 )   PT: 13.0 sec;   INR: 1.13 ratio         PTT - ( 27 Mar 2020 08:34 )  PTT:34.6 sec      Urinalysis Basic - ( 26 Mar 2020 22:01 )    Color: Yellow / Appearance: Clear / S.018 / pH: x  Gluc: x / Ketone: Trace  / Bili: Negative / Urobili: Negative   Blood: x / Protein: 30 mg/dL / Nitrite: Negative   Leuk Esterase: Negative / RBC: 1 /hpf / WBC 0 /HPF   Sq Epi: x / Non Sq Epi: 1 /hpf / Bacteria: Negative           @ 22:01  3.9  26              Consultant(s) Notes Reviewed:      Care Discussed with Consultants/Other Providers:

## 2020-03-27 NOTE — H&P ADULT - PROBLEM SELECTOR PLAN 3
- unclear source. abd exam benign. poss viral URI?  - f/u blood cx  - if RVP is negative, would recheck covid PCR  - will observe off abx for now. low threshold for starting abx

## 2020-03-27 NOTE — H&P ADULT - NSICDXPASTSURGICALHX_GEN_ALL_CORE_FT
PAST SURGICAL HISTORY:  History of right hip replacement     S/P PING (total abdominal hysterectomy)

## 2020-03-27 NOTE — CONSULT NOTE ADULT - ASSESSMENT
79F c hx HTN, right hip replacement, pw 1 month of dry cough, and fall c/b left hip pain and inability to ambulate.  Pt states she has had a dry cough for about 1 month, but was otherwise in her usual state of health until 3 days ago, when she was walking in her home and suddenly fell backwards. Pt isn't sure what caused the fall, but pt denies any pre-syncope and LOC at the time. Pt states she fell onto her left hip, and pt immediately called out to her , who helped pt to the bed. Pt states she was unable to get up on her own at that time and has not been able to ambulate since then. Pt denies head trauma. Pt states she did not know she had a fever until arriving in the Ed, where she noticed she was also having chills. Other ROS as below.  pt with known hx of htn ?cad, s/p fall , ?syncope.  pt with no hx of arrythmia but is been  taking few meds for bp control, including HCTZ.  no chest pain.  s/p fall syncope  dc all bp meds including HCTZ  chest ct  ?r/o COVID  dvt prophylaxis  iv hydration  awaiting ecg

## 2020-03-27 NOTE — CONSULT NOTE ADULT - ASSESSMENT
79F c hx HTN, right hip replacement, pw 1 month of dry cough, and fall c/b left hip pain and inability to ambulate.    Pt states she has had a dry cough for about 1 month, but was otherwise in her usual state of health until 3 days ago, when she was walking in her home and suddenly fell backwards. Pt isn't sure what caused the fall, but pt denies any pre-syncope and LOC at the time. Pt states she fell onto her left hip, and pt immediately called out to her , who helped pt to the bed. Pt states she was unable to get up on her own at that time and has not been able to ambulate since then. Pt denies head trauma. Pt states she did not know she had a fever until arriving in the Ed, where she noticed she was also having chills. Other ROS as below.    VS: Tm 102.9, P 106, /56, R 20, 95% RA  In the Ed, received ceftriaxone, 1.5L, tylenol (27 Mar 2020 02:44)    WBC nl. UA (-) LE/nit.   RVP (-), Covid PCR (-) x 2.  Cxr clear.  CT pelvis shows acute, nondisplaced fracture of the anterior left acetabulum and  left inferior pubic ramus.  Xray showing possible left tibial plateau fx, but ortho does not think it is.  No acute intervention at this time.  Pt received rocephin x 1 in ER.  Being observed off abx.  ID  called for evaluation of fever.        Fever:    - Pt p/w fall and found to have nondisplaced fracture L acetabulum and pubic ramus.  No orthopedic intervention.    - Pt has chronic dry cough/post nasal drip.  RVP and Covid (-).  Cxr clear, no evidence for pna.     - UA (-)    - LFTs mildly elevated, cont to monitor.  Pt currently w/o abd or RUQ pain.  If rising, check RUQ US, hepatitits panel, EBV, CMV    - Agree with monitoring off abx.  f/u blood cultures, ucx    Will follow,    Stefanie Jiang  [phone]

## 2020-03-27 NOTE — CONSULT NOTE ADULT - ASSESSMENT
A/P:  80 yo F w/ possible left inferior pubic rami, left anterior column fracture:  -TTWB LLE/PT  -pain control  -medical management  -COVID negative  -dvt ppx  -no acute orthopedic surgical intervention indicated at this time  -ortho stable  -FU with Dr. Crum once discharged, call office for an appt  -will discuss with Dr. Crum, and update plan if any changes A/P:  80 yo F w/ possible left inferior pubic rami, left anterior column fracture:  -WBAT LLE/PT  -pain control  -medical management  -COVID negative  -dvt ppx  -no acute orthopedic surgical intervention indicated at this time  -ortho stable  -FU with Dr. Crum once discharged, call office for an appt  -will discuss with Dr. Crum, and update plan if any changes

## 2020-03-27 NOTE — CONSULT NOTE ADULT - CONSULT REASON
Left inferior pubic rami fx, acetabular fx, possible tibial plateau fracture
htn/ fall/ syncope
fever

## 2020-03-27 NOTE — CONSULT NOTE ADULT - SUBJECTIVE AND OBJECTIVE BOX
Patient is a 79y old  Female who presents with a chief complaint of left hip pain, fall (27 Mar 2020 10:54)      HPI:    79F c hx HTN, right hip replacement, pw 1 month of dry cough, and fall c/b left hip pain and inability to ambulate.    Pt states she has had a dry cough for about 1 month, but was otherwise in her usual state of health until 3 days ago, when she was walking in her home and suddenly fell backwards. Pt isn't sure what caused the fall, but pt denies any pre-syncope and LOC at the time. Pt states she fell onto her left hip, and pt immediately called out to her , who helped pt to the bed. Pt states she was unable to get up on her own at that time and has not been able to ambulate since then. Pt denies head trauma. Pt states she did not know she had a fever until arriving in the Ed, where she noticed she was also having chills. Other ROS as below.    VS: Tm 102.9, P 106, /56, R 20, 95% RA  In the Ed, received ceftriaxone, 1.5L, tylenol (27 Mar 2020 02:44)    WBC nl. UA (-) LE/nit.   RVP (-), Covid PCR (-) x 2.  Cxr clear.  CT pelvis shows acute, nondisplaced fracture of the anterior left acetabulum and  left inferior pubic ramus.  Xray showing possible left tibial plateau fx, but ortho does not think it is.  No acute intervention at this time.  Pt received rocephin x 1 in ER.  Being observed off abx.  ID  called for evaluation of fever.          REVIEW OF SYSTEMS:    CONSTITUTIONAL: No fever, weight loss, or fatigue  EYES: No eye pain, visual disturbances, or discharge  ENMT:  No sore throat  NECK: No pain or stiffness  RESPIRATORY: No cough, wheezing, chills or hemoptysis; No shortness of breath  CARDIOVASCULAR: No chest pain, palpitations, dizziness, or leg swelling  GASTROINTESTINAL: No abdominal or epigastric pain. No nausea, vomiting, or hematemesis; No diarrhea or constipation. No melena or hematochezia.  GENITOURINARY: No dysuria, frequency, hematuria, or incontinence  NEUROLOGICAL: No headaches, memory loss, loss of strength, numbness, or tremors  SKIN: No itching, burning, rashes, or lesions   LYMPH NODES: No enlarged glands  MUSCULOSKELETAL: No joint pain or swelling; No muscle, back, or extremity pain      PAST MEDICAL & SURGICAL HISTORY:  HTN (hypertension)  No pertinent past medical history  History of right hip replacement  S/P PING (total abdominal hysterectomy)      Allergies    Cipro (Nausea)  contrast media (gadolinium-based) (Faint)  Minocin (Nausea)    Intolerances        FAMILY HISTORY:  No pertinent family history in first degree relatives      SOCIAL HISTORY:        MEDICATIONS  (STANDING):  ATENolol  Tablet 25 milliGRAM(s) Oral daily  enoxaparin Injectable 40 milliGRAM(s) SubCutaneous daily  loratadine 10 milliGRAM(s) Oral at bedtime  polyethylene glycol 3350 17 Gram(s) Oral daily  senna 2 Tablet(s) Oral at bedtime    MEDICATIONS  (PRN):  acetaminophen   Tablet .. 650 milliGRAM(s) Oral every 8 hours PRN Mild Pain (1 - 3)  oxyCODONE    IR 5 milliGRAM(s) Oral every 6 hours PRN Moderate Pain (4 - 6)      Vital Signs Last 24 Hrs  T(C): 37.9 (27 Mar 2020 11:40), Max: 39.4 (26 Mar 2020 19:13)  T(F): 100.2 (27 Mar 2020 11:40), Max: 102.9 (26 Mar 2020 19:13)  HR: 76 (27 Mar 2020 11:40) (69 - 106)  BP: 130/51 (27 Mar 2020 11:40) (101/56 - 133/81)  BP(mean): 77 (26 Mar 2020 23:08) (77 - 77)  RR: 19 (27 Mar 2020 11:40) (19 - 20)  SpO2: 98% (27 Mar 2020 11:40) (95% - 99%)    PHYSICAL EXAM:    GENERAL: NAD, well-groomed  HEAD:  Atraumatic, Normocephalic  EYES: EOMI, PERRLA, conjunctiva and sclera clear  ENMT: No tonsillar erythema, exudates, or enlargement; Moist mucous membranes  NECK: Supple, No JVD  CHEST/LUNG: Clear to percussion bilaterally; No rales, rhonchi, wheezing, or rubs  HEART: Regular rate and rhythm; No murmurs, rubs, or gallops  ABDOMEN: Soft, Nontender, Nondistended; Bowel sounds present  EXTREMITIES:  2+ Peripheral Pulses, No clubbing, cyanosis, or edema  LYMPH: No lymphadenopathy noted  SKIN: No rashes or lesions    LABS:  CBC Full  -  ( 27 Mar 2020 07:02 )  WBC Count : 5.81 K/uL  RBC Count : 3.77 M/uL  Hemoglobin : 11.4 g/dL  Hematocrit : 35.5 %  Platelet Count - Automated : 126 K/uL  Mean Cell Volume : 94.2 fl  Mean Cell Hemoglobin : 30.2 pg  Mean Cell Hemoglobin Concentration : 32.1 gm/dL  Auto Neutrophil # : 3.67 K/uL  Auto Lymphocyte # : 1.21 K/uL  Auto Monocyte # : 0.66 K/uL  Auto Eosinophil # : 0.09 K/uL  Auto Basophil # : 0.01 K/uL  Auto Neutrophil % : 63.2 %  Auto Lymphocyte % : 20.8 %  Auto Monocyte % : 11.4 %  Auto Eosinophil % : 1.5 %  Auto Basophil % : 0.2 %          135  |  100  |  17  ----------------------------<  106<H>  4.0   |  23  |  0.94    Ca    9.2      27 Mar 2020 07:02  Phos  3.3       Mg     1.6         TPro  6.4  /  Alb  3.7  /  TBili  0.9  /  DBili  x   /  AST  52<H>  /  ALT  47<H>  /  AlkPhos  47        LIVER FUNCTIONS - ( 27 Mar 2020 07:02 )  Alb: 3.7 g/dL / Pro: 6.4 g/dL / ALK PHOS: 47 U/L / ALT: 47 U/L / AST: 52 U/L / GGT: x                               MICROBIOLOGY:        Urinalysis Basic - ( 26 Mar 2020 22:01 )    Color: Yellow / Appearance: Clear / S.018 / pH: x  Gluc: x / Ketone: Trace  / Bili: Negative / Urobili: Negative   Blood: x / Protein: 30 mg/dL / Nitrite: Negative   Leuk Esterase: Negative / RBC: 1 /hpf / WBC 0 /HPF   Sq Epi: x / Non Sq Epi: 1 /hpf / Bacteria: Negative      COVID-19 PCR . (20 @ 06:10)    COVID-19 PCR: NotDetec: As with all clinical testing, results should be correlated with clinical  findings.  This test has been validated by [employer] to be accurate;  though it has not been FDA cleared/approved by the usual pathway.  As with all laboratory tests, results should be correlated with clinical  findings.    COVID-19 PCR . (20 @ 20:32)    COVID-19 PCR: NotDetec: As with all clinical testing, results should be correlated with clinical  findings.  This test has been validated by [employer] to be accurate;  though it has not been FDA cleared/approved by the usual pathway.  As with all laboratory tests, results should be correlated with clinical  findings.    Rapid Respiratory Viral Panel (20 @ 00:14)    Rapid RVP Result: NotDetec: This Respiratory Panel does NOT detect the 2019 novel coronavirus  (2019-nCoV) involved with the outbreak originating in [facility].  This Respiratory Panel uses polymerase chain reaction (PCR) to detect for  adenovirus; coronavirus (HKU1, NL63, 229E, OC43); human metapneumovirus  (hMPV); human enterovirus/rhinovirus (Entero/RV); influenza A; influenza  A/H1; influenza A/H3; influenza A/H1-2009; influenza B; parainfluenza  viruses 1, 2, 3, 4; respiratory syncytial virus; Mycoplasma pneumoniae;  and Chlamydophila pneumoniae.              RADIOLOGY:      < from: CT 3D Reconstruct w/ Workstation (20 @ 00:32) >  FINDINGS:    Bone: An acute, nondisplaced fracture of the anterior left acetabulum is noted. An acute, nondisplaced fracture of the left inferior pubic ramus is seen. The patient is status post a total right hip arthroplasty. No periprosthetic fracture or dislocation is demonstrated. No periprosthetic lucency is seen to suggest loosening. Degenerative changes of the spine are seen.    Soft tissues: Sigmoid diverticulosis is seen without evidence of diverticulitis. The patient is status post a hysterectomy. No adnexal masses are present. A focus of intravesicular gas is seen possibly secondary to recent instrumentation. Subcutaneous varices are noted in the anterior right hip.    IMPRESSION:    1. Acute, nondisplaced fracture of the anterior left acetabulum.  2. Acute, nondisplaced of the left inferior pubic ramus.    < end of copied text >        < from: CT Pelvis Bony Only No Cont (20 @ 00:23) >  FINDINGS:    Bone: An acute, nondisplaced fracture of the anterior left acetabulum is noted. An acute, nondisplaced fracture of the left inferior pubic ramus is seen. The patient is status post a total right hip arthroplasty. No periprosthetic fracture or dislocation is demonstrated. No periprosthetic lucency is seen to suggest loosening. Degenerative changes of the spine are seen.    Soft tissues: Sigmoid diverticulosis is seen without evidence of diverticulitis. The patient is status post a hysterectomy. No adnexal masses are present. A focus of intravesicular gas is seen possibly secondary to recent instrumentation. Subcutaneous varices are noted in the anterior right hip.    IMPRESSION:    1. Acute, nondisplaced fracture of the anterior left acetabulum.  2. Acute, nondisplaced of the left inferior pubic ramus.    < end of copied text >        < from: Xray Hip w/ Pelvis 2 or 3 Views, Left (20 @ 22:33) >  FINDINGS:    There is a lucency at the lateral aspect of the tibia best seen on the frontal x-ray of the left knee. Evaluation is limited secondary to overlying material.    No acute displaced fracture of the left hip. No dislocation. Mild arthrosis of the left hip. 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 Status post right hip arthroplasty.        IMPRESSION:    There is a lucency at the anterolateral aspect of the tibia best seen on the frontal x-ray of the left knee. Evaluation is limited secondary to overlying material, however this may represent a fracture. Correlate with point tenderness.. CT of the knee can be obtained if clinically indicated    < end of copied text >            < from: Xray Femur 2 Views, Left (20 @ 22:31) >  FINDINGS:    There is a lucency at the lateral aspect of the tibia best seen on the frontal x-ray of the left knee. Evaluation is limited secondary to overlying material.    No acute displaced fracture of the left hip. No dislocation. Mild arthrosis of the left hip. 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 Status post right hip arthroplasty.        IMPRESSION:    There is a lucency at the anterolateral aspect of the tibia best seen on the frontal x-ray of the left knee. Evaluation is limited secondary to overlying material, however this may represent a fracture. Correlate with point tenderness.. CT of the knee can be obtained if clinically indicated    < end of copied text >        < from: Xray Humerus, Left (20 @ 22:30) >  EXAM:  HUMERUS LEFT                          EXAM:  SHOULDER LEFT (MINIMUM 2 VIEWS)                            PROCEDURE DATE:  2020            INTERPRETATION:  CLINICAL INFORMATION: Pain status post fall.    TECHNIQUE: 3 views of the left shoulder. 2 views of the left humerus.    COMPARISON: None.    FINDINGS:     No acute fracture or dislocation. Glenohumeral joint space narrowing and osteophytes compatible with osteoarthrosis. Soft tissues are unremarkable. No radiopaque foreign body.    IMPRESSION:     No acute fracture or dislocation.     < end of copied text >        < from: Xray Chest 1 View AP/PA (20 @ 22:23) >  FINDINGS:    No acute displaced fracture. No pneumothorax or pleural effusion. Lungs are clear. Mediastinal silhouette is normal.    IMPRESSION:     Clear lungs.    < end of copied text > Patient is a 79y old  Female who presents with a chief complaint of left hip pain, fall (27 Mar 2020 10:54)      HPI:    79F c hx HTN, right hip replacement, pw 1 month of dry cough, and fall c/b left hip pain and inability to ambulate.    Pt states she has had a dry cough for about 1 month, but was otherwise in her usual state of health until 3 days ago, when she was walking in her home and suddenly fell backwards. Pt isn't sure what caused the fall, but pt denies any pre-syncope and LOC at the time. Pt states she fell onto her left hip, and pt immediately called out to her , who helped pt to the bed. Pt states she was unable to get up on her own at that time and has not been able to ambulate since then. Pt denies head trauma. Pt states she did not know she had a fever until arriving in the Ed, where she noticed she was also having chills. Other ROS as below.    VS: Tm 102.9, P 106, /56, R 20, 95% RA  In the Ed, received ceftriaxone, 1.5L, tylenol (27 Mar 2020 02:44)    WBC nl. UA (-) LE/nit.   RVP (-), Covid PCR (-) x 2.  Cxr clear.  CT pelvis shows acute, nondisplaced fracture of the anterior left acetabulum and  left inferior pubic ramus.  Xray showing possible left tibial plateau fx, but ortho does not think it is.  No acute intervention at this time.  Pt received rocephin x 1 in ER.  Being observed off abx.  ID  called for evaluation of fever.          REVIEW OF SYSTEMS:    CONSTITUTIONAL: No fever, weight loss, or fatigue  EYES: No eye pain, visual disturbances, or discharge  ENMT:  No sore throat  NECK: No pain or stiffness  RESPIRATORY: No cough, wheezing, chills or hemoptysis; No shortness of breath  CARDIOVASCULAR: No chest pain, palpitations, dizziness, or leg swelling  GASTROINTESTINAL: No abdominal or epigastric pain. No nausea, vomiting, or hematemesis; No diarrhea or constipation. No melena or hematochezia.  GENITOURINARY: No dysuria, frequency, hematuria, or incontinence  NEUROLOGICAL: No headaches, memory loss, loss of strength, numbness, or tremors  SKIN: No itching, burning, rashes, or lesions   LYMPH NODES: No enlarged glands  MUSCULOSKELETAL: No joint pain or swelling; No muscle, back, or extremity pain      PAST MEDICAL & SURGICAL HISTORY:  HTN (hypertension)  No pertinent past medical history  History of right hip replacement  S/P PING (total abdominal hysterectomy)      Allergies    Cipro (Nausea)  contrast media (gadolinium-based) (Faint)  Minocin (Nausea)    Intolerances        FAMILY HISTORY:  No pertinent family history in first degree relatives      SOCIAL HISTORY:    No smoking, ivdu, etoh    MEDICATIONS  (STANDING):  ATENolol  Tablet 25 milliGRAM(s) Oral daily  enoxaparin Injectable 40 milliGRAM(s) SubCutaneous daily  loratadine 10 milliGRAM(s) Oral at bedtime  polyethylene glycol 3350 17 Gram(s) Oral daily  senna 2 Tablet(s) Oral at bedtime    MEDICATIONS  (PRN):  acetaminophen   Tablet .. 650 milliGRAM(s) Oral every 8 hours PRN Mild Pain (1 - 3)  oxyCODONE    IR 5 milliGRAM(s) Oral every 6 hours PRN Moderate Pain (4 - 6)      Vital Signs Last 24 Hrs  T(C): 37.9 (27 Mar 2020 11:40), Max: 39.4 (26 Mar 2020 19:13)  T(F): 100.2 (27 Mar 2020 11:40), Max: 102.9 (26 Mar 2020 19:13)  HR: 76 (27 Mar 2020 11:40) (69 - 106)  BP: 130/51 (27 Mar 2020 11:40) (101/56 - 133/81)  BP(mean): 77 (26 Mar 2020 23:08) (77 - 77)  RR: 19 (27 Mar 2020 11:40) (19 - 20)  SpO2: 98% (27 Mar 2020 11:40) (95% - 99%)    PHYSICAL EXAM:    GENERAL: NAD, well-groomed  HEAD:  Atraumatic, Normocephalic  EYES: EOMI, PERRLA, conjunctiva and sclera clear  ENMT: No tonsillar erythema, exudates, or enlargement; Moist mucous membranes  NECK: Supple, No JVD  CHEST/LUNG: Clear to percussion bilaterally; No rales, rhonchi, wheezing, or rubs  HEART: Regular rate and rhythm; No murmurs, rubs, or gallops  ABDOMEN: Soft, Nontender, Nondistended; Bowel sounds present  EXTREMITIES:  2+ Peripheral Pulses, No clubbing, cyanosis, or edema  LYMPH: No lymphadenopathy noted  SKIN: fungal rash below b/l breasts    LABS:  CBC Full  -  ( 27 Mar 2020 07:02 )  WBC Count : 5.81 K/uL  RBC Count : 3.77 M/uL  Hemoglobin : 11.4 g/dL  Hematocrit : 35.5 %  Platelet Count - Automated : 126 K/uL  Mean Cell Volume : 94.2 fl  Mean Cell Hemoglobin : 30.2 pg  Mean Cell Hemoglobin Concentration : 32.1 gm/dL  Auto Neutrophil # : 3.67 K/uL  Auto Lymphocyte # : 1.21 K/uL  Auto Monocyte # : 0.66 K/uL  Auto Eosinophil # : 0.09 K/uL  Auto Basophil # : 0.01 K/uL  Auto Neutrophil % : 63.2 %  Auto Lymphocyte % : 20.8 %  Auto Monocyte % : 11.4 %  Auto Eosinophil % : 1.5 %  Auto Basophil % : 0.2 %          135  |  100  |  17  ----------------------------<  106<H>  4.0   |  23  |  0.94    Ca    9.2      27 Mar 2020 07:02  Phos  3.3       Mg     1.6         TPro  6.4  /  Alb  3.7  /  TBili  0.9  /  DBili  x   /  AST  52<H>  /  ALT  47<H>  /  AlkPhos  47        LIVER FUNCTIONS - ( 27 Mar 2020 07:02 )  Alb: 3.7 g/dL / Pro: 6.4 g/dL / ALK PHOS: 47 U/L / ALT: 47 U/L / AST: 52 U/L / GGT: x                               MICROBIOLOGY:        Urinalysis Basic - ( 26 Mar 2020 22:01 )    Color: Yellow / Appearance: Clear / S.018 / pH: x  Gluc: x / Ketone: Trace  / Bili: Negative / Urobili: Negative   Blood: x / Protein: 30 mg/dL / Nitrite: Negative   Leuk Esterase: Negative / RBC: 1 /hpf / WBC 0 /HPF   Sq Epi: x / Non Sq Epi: 1 /hpf / Bacteria: Negative      COVID-19 PCR . (20 @ 06:10)    COVID-19 PCR: NotDetec: As with all clinical testing, results should be correlated with clinical  findings.  This test has been validated by [employer] to be accurate;  though it has not been FDA cleared/approved by the usual pathway.  As with all laboratory tests, results should be correlated with clinical  findings.    COVID-19 PCR . (20 @ 20:32)    COVID-19 PCR: NotDetec: As with all clinical testing, results should be correlated with clinical  findings.  This test has been validated by [employer] to be accurate;  though it has not been FDA cleared/approved by the usual pathway.  As with all laboratory tests, results should be correlated with clinical  findings.    Rapid Respiratory Viral Panel (20 @ 00:14)    Rapid RVP Result: NotDetec: This Respiratory Panel does NOT detect the 2019 novel coronavirus  (2019-nCoV) involved with the outbreak originating in [facility].  This Respiratory Panel uses polymerase chain reaction (PCR) to detect for  adenovirus; coronavirus (HKU1, NL63, 229E, OC43); human metapneumovirus  (hMPV); human enterovirus/rhinovirus (Entero/RV); influenza A; influenza  A/H1; influenza A/H3; influenza A/H1-2009; influenza B; parainfluenza  viruses 1, 2, 3, 4; respiratory syncytial virus; Mycoplasma pneumoniae;  and Chlamydophila pneumoniae.              RADIOLOGY:      < from: CT 3D Reconstruct w/ Workstation (20 @ 00:32) >  FINDINGS:    Bone: An acute, nondisplaced fracture of the anterior left acetabulum is noted. An acute, nondisplaced fracture of the left inferior pubic ramus is seen. The patient is status post a total right hip arthroplasty. No periprosthetic fracture or dislocation is demonstrated. No periprosthetic lucency is seen to suggest loosening. Degenerative changes of the spine are seen.    Soft tissues: Sigmoid diverticulosis is seen without evidence of diverticulitis. The patient is status post a hysterectomy. No adnexal masses are present. A focus of intravesicular gas is seen possibly secondary to recent instrumentation. Subcutaneous varices are noted in the anterior right hip.    IMPRESSION:    1. Acute, nondisplaced fracture of the anterior left acetabulum.  2. Acute, nondisplaced of the left inferior pubic ramus.    < end of copied text >        < from: CT Pelvis Bony Only No Cont (20 @ 00:23) >  FINDINGS:    Bone: An acute, nondisplaced fracture of the anterior left acetabulum is noted. An acute, nondisplaced fracture of the left inferior pubic ramus is seen. The patient is status post a total right hip arthroplasty. No periprosthetic fracture or dislocation is demonstrated. No periprosthetic lucency is seen to suggest loosening. Degenerative changes of the spine are seen.    Soft tissues: Sigmoid diverticulosis is seen without evidence of diverticulitis. The patient is status post a hysterectomy. No adnexal masses are present. A focus of intravesicular gas is seen possibly secondary to recent instrumentation. Subcutaneous varices are noted in the anterior right hip.    IMPRESSION:    1. Acute, nondisplaced fracture of the anterior left acetabulum.  2. Acute, nondisplaced of the left inferior pubic ramus.    < end of copied text >        < from: Xray Hip w/ Pelvis 2 or 3 Views, Left (20 @ 22:33) >  FINDINGS:    There is a lucency at the lateral aspect of the tibia best seen on the frontal x-ray of the left knee. Evaluation is limited secondary to overlying material.    No acute displaced fracture of the left hip. No dislocation. Mild arthrosis of the left hip. 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 Status post right hip arthroplasty.        IMPRESSION:    There is a lucency at the anterolateral aspect of the tibia best seen on the frontal x-ray of the left knee. Evaluation is limited secondary to overlying material, however this may represent a fracture. Correlate with point tenderness.. CT of the knee can be obtained if clinically indicated    < end of copied text >            < from: Xray Femur 2 Views, Left (20 @ 22:31) >  FINDINGS:    There is a lucency at the lateral aspect of the tibia best seen on the frontal x-ray of the left knee. Evaluation is limited secondary to overlying material.    No acute displaced fracture of the left hip. No dislocation. Mild arthrosis of the left hip. 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 Status post right hip arthroplasty.        IMPRESSION:    There is a lucency at the anterolateral aspect of the tibia best seen on the frontal x-ray of the left knee. Evaluation is limited secondary to overlying material, however this may represent a fracture. Correlate with point tenderness.. CT of the knee can be obtained if clinically indicated    < end of copied text >        < from: Xray Humerus, Left (20 @ 22:30) >  EXAM:  HUMERUS LEFT                          EXAM:  SHOULDER LEFT (MINIMUM 2 VIEWS)                            PROCEDURE DATE:  2020            INTERPRETATION:  CLINICAL INFORMATION: Pain status post fall.    TECHNIQUE: 3 views of the left shoulder. 2 views of the left humerus.    COMPARISON: None.    FINDINGS:     No acute fracture or dislocation. Glenohumeral joint space narrowing and osteophytes compatible with osteoarthrosis. Soft tissues are unremarkable. No radiopaque foreign body.    IMPRESSION:     No acute fracture or dislocation.     < end of copied text >        < from: Xray Chest 1 View AP/PA (20 @ 22:23) >  FINDINGS:    No acute displaced fracture. No pneumothorax or pleural effusion. Lungs are clear. Mediastinal silhouette is normal.    IMPRESSION:     Clear lungs.    < end of copied text >

## 2020-03-27 NOTE — H&P ADULT - NSHPLABSRESULTS_GEN_ALL_CORE
Personally reviewed labs.   Personally reviewed imaging.                         12.7   7.75  )-----------( 143      ( 26 Mar 2020 22:01 )             38.7           135  |  95<L>  |  17  ----------------------------<  140<H>  4.1   |  23  |  1.10    Ca    10.4      26 Mar 2020 22:01    TPro  7.6  /  Alb  4.5  /  TBili  1.3<H>  /  DBili  x   /  AST  40  /  ALT  43  /  AlkPhos  57              LIVER FUNCTIONS - ( 26 Mar 2020 22:01 )  Alb: 4.5 g/dL / Pro: 7.6 g/dL / ALK PHOS: 57 U/L / ALT: 43 U/L / AST: 40 U/L / GGT: x             PT/INR - ( 26 Mar 2020 22:01 )   PT: 13.1 sec;   INR: 1.13 ratio         PTT - ( 26 Mar 2020 22:01 )  PTT:35.6 sec    Urinalysis Basic - ( 26 Mar 2020 22:01 )    Color: Yellow / Appearance: Clear / S.018 / pH: x  Gluc: x / Ketone: Trace  / Bili: Negative / Urobili: Negative   Blood: x / Protein: 30 mg/dL / Nitrite: Negative   Leuk Esterase: Negative / RBC: 1 /hpf / WBC 0 /HPF   Sq Epi: x / Non Sq Epi: 1 /hpf / Bacteria: Negative

## 2020-03-27 NOTE — PROGRESS NOTE ADULT - ASSESSMENT
79F        hx HTN, right hip replacement,    admitted   with   dry cough,, , low  grade fever,   and left hip pain after fall   ct pelvis, Left acetabular fx/ Left inf pubic  ramus  fx   xray knee. ?  fx, left tibia    HTN, on atenolol/  stopped  diuretic  febrile,  RVP  and COVID  19.. negative/ cxr, clear  blood  and urine  cs/, pending   ID d r Jiang  mild  elevated lfts  labs in  am   ortho  eval/  pain meds/ laxatives    discussed  ulisses np/ Rafael   dvt ppx/ PT eval     < from: CT 3D Reconstruct w/ Workstation (03.27.20 @ 00:32) >  es are noted in the anterior right hip.  IMPRESSION:  1. Acute, nondisplaced fracture of the anterior left acetabulum.  2. Acute, nondisplaced of the left inferior pubic ramus.  < end of copied text >     < from: Xray Knee 3 Views, Left (03.26.20 @ 22:24) >  IMPRESSION:  There is a lucency at the anterolateral aspect of the tibia best seen on the frontal x-ray of the left knee. Evaluation is limited secondary to overlying material, however this may represent a fracture. Correlate with point tenderness.. CT of the knee can be obtained if clinically indicated  < end of copied text >

## 2020-03-28 LAB
-  COAGULASE NEGATIVE STAPHYLOCOCCUS: SIGNIFICANT CHANGE UP
ALBUMIN SERPL ELPH-MCNC: 3.4 G/DL — SIGNIFICANT CHANGE UP (ref 3.3–5)
ALP SERPL-CCNC: 42 U/L — SIGNIFICANT CHANGE UP (ref 40–120)
ALT FLD-CCNC: 65 U/L — HIGH (ref 10–45)
AST SERPL-CCNC: 72 U/L — HIGH (ref 10–40)
BILIRUB DIRECT SERPL-MCNC: 0.1 MG/DL — SIGNIFICANT CHANGE UP (ref 0–0.2)
BILIRUB INDIRECT FLD-MCNC: 0.5 MG/DL — SIGNIFICANT CHANGE UP (ref 0.2–1)
BILIRUB SERPL-MCNC: 0.6 MG/DL — SIGNIFICANT CHANGE UP (ref 0.2–1.2)
GRAM STN FLD: SIGNIFICANT CHANGE UP
HCT VFR BLD CALC: 33.5 % — LOW (ref 34.5–45)
HCT VFR BLD CALC: 35.1 % — SIGNIFICANT CHANGE UP (ref 34.5–45)
HGB BLD-MCNC: 10.7 G/DL — LOW (ref 11.5–15.5)
HGB BLD-MCNC: 11.2 G/DL — LOW (ref 11.5–15.5)
MCHC RBC-ENTMCNC: 30.3 PG — SIGNIFICANT CHANGE UP (ref 27–34)
MCHC RBC-ENTMCNC: 30.4 PG — SIGNIFICANT CHANGE UP (ref 27–34)
MCHC RBC-ENTMCNC: 31.9 GM/DL — LOW (ref 32–36)
MCHC RBC-ENTMCNC: 31.9 GM/DL — LOW (ref 32–36)
MCV RBC AUTO: 94.9 FL — SIGNIFICANT CHANGE UP (ref 80–100)
MCV RBC AUTO: 95.2 FL — SIGNIFICANT CHANGE UP (ref 80–100)
METHOD TYPE: SIGNIFICANT CHANGE UP
NRBC # BLD: 0 /100 WBCS — SIGNIFICANT CHANGE UP (ref 0–0)
NRBC # BLD: 0 /100 WBCS — SIGNIFICANT CHANGE UP (ref 0–0)
PLATELET # BLD AUTO: 143 K/UL — LOW (ref 150–400)
PLATELET # BLD AUTO: 148 K/UL — LOW (ref 150–400)
PROT SERPL-MCNC: 6.4 G/DL — SIGNIFICANT CHANGE UP (ref 6–8.3)
RBC # BLD: 3.52 M/UL — LOW (ref 3.8–5.2)
RBC # BLD: 3.7 M/UL — LOW (ref 3.8–5.2)
RBC # FLD: 13.7 % — SIGNIFICANT CHANGE UP (ref 10.3–14.5)
RBC # FLD: 13.7 % — SIGNIFICANT CHANGE UP (ref 10.3–14.5)
WBC # BLD: 5.03 K/UL — SIGNIFICANT CHANGE UP (ref 3.8–10.5)
WBC # BLD: 5.2 K/UL — SIGNIFICANT CHANGE UP (ref 3.8–10.5)
WBC # FLD AUTO: 5.03 K/UL — SIGNIFICANT CHANGE UP (ref 3.8–10.5)
WBC # FLD AUTO: 5.2 K/UL — SIGNIFICANT CHANGE UP (ref 3.8–10.5)

## 2020-03-28 PROCEDURE — 93010 ELECTROCARDIOGRAM REPORT: CPT

## 2020-03-28 PROCEDURE — 99232 SBSQ HOSP IP/OBS MODERATE 35: CPT

## 2020-03-28 RX ORDER — ZOLPIDEM TARTRATE 10 MG/1
5 TABLET ORAL AT BEDTIME
Refills: 0 | Status: DISCONTINUED | OUTPATIENT
Start: 2020-03-28 | End: 2020-03-28

## 2020-03-28 RX ADMIN — ATENOLOL 25 MILLIGRAM(S): 25 TABLET ORAL at 13:05

## 2020-03-28 RX ADMIN — ZOLPIDEM TARTRATE 5 MILLIGRAM(S): 10 TABLET ORAL at 00:27

## 2020-03-28 RX ADMIN — SENNA PLUS 2 TABLET(S): 8.6 TABLET ORAL at 21:50

## 2020-03-28 RX ADMIN — ENOXAPARIN SODIUM 40 MILLIGRAM(S): 100 INJECTION SUBCUTANEOUS at 13:05

## 2020-03-28 NOTE — CHART NOTE - NSCHARTNOTEFT_GEN_A_CORE
Medicine PA Note     MIRNA JEAN  MRN-[id number]      Interval History:  Informed by RN, patient with blood culture resulted as below. Patient otherwise HD stable    Culture - Blood (03.27.20 @ 00:57)    -  Coagulase negative Staphylococcus: Detec    Gram Stain:   Growth in aerobic bottle: Gram Positive Cocci in Clusters    Specimen Source: .Blood Blood    Organism: Blood Culture PCR    Culture Results:   Growth in aerobic bottle: Gram Positive Cocci in Clusters  ***Blood Panel PCR results on this specimen are available  approximately 3 hours after the Gram stain result.***  Gram stain, PCR, and/or culture results may not always  correspond due to difference in methodologies.  ************************************************************  This PCR assay was performed using [employer].  The following targets are tested for: Enterococcus,  vancomycin resistant enterococci, Listeria monocytogenes,  coagulase negative staphylococci, S. aureus,  methicillin resistant S. aureus, Streptococcus agalactiae  (Group B), S. pneumoniae, S. pyogenes (Group A),  Acinetobacter baumannii, Enterobacter cloacae, E. coli,  Klebsiella oxytoca, K. pneumoniae, Proteus sp.,  Serratia marcescens, Haemophilus influenzae,  Neisseria meningitidis, Pseudomonas aeruginosa, Candida  albicans, C. glabrata, C krusei, C parapsilosis,  C. tropicalis and the KPC resistance gene.    Organism Identification: Blood Culture PCR    Method Type: PCR    -Likely contaminated culture. Will repeat blood cultures Medicine PA Note     MIRNA JEAN  MRN-[id number]      Interval History:  Informed by RN, patient with blood culture resulted as below. Patient otherwise HD stable    Culture - Blood (03.27.20 @ 00:57)    -  Coagulase negative Staphylococcus: Detec    Gram Stain:   Growth in aerobic bottle: Gram Positive Cocci in Clusters    Specimen Source: .Blood Blood    Organism: Blood Culture PCR    Culture Results:   Growth in aerobic bottle: Gram Positive Cocci in Clusters  ***Blood Panel PCR results on this specimen are available  approximately 3 hours after the Gram stain result.***  Gram stain, PCR, and/or culture results may not always  correspond due to difference in methodologies.  ************************************************************  This PCR assay was performed using [employer].  The following targets are tested for: Enterococcus,  vancomycin resistant enterococci, Listeria monocytogenes,  coagulase negative staphylococci, S. aureus,  methicillin resistant S. aureus, Streptococcus agalactiae  (Group B), S. pneumoniae, S. pyogenes (Group A),  Acinetobacter baumannii, Enterobacter cloacae, E. coli,  Klebsiella oxytoca, K. pneumoniae, Proteus sp.,  Serratia marcescens, Haemophilus influenzae,  Neisseria meningitidis, Pseudomonas aeruginosa, Candida  albicans, C. glabrata, C krusei, C parapsilosis,  C. tropicalis and the KPC resistance gene.    Organism Identification: Blood Culture PCR    Method Type: PCR    -Likely contaminated culture. Will repeat blood cultures to ensure no active infection.  -Will continue to monitor temp curve.  -Will endorse above events to day team.  -Attending to follow in AM. Medicine PA Note     MIRNA JEAN  MRN-[id number]      Interval History:  Informed by RN, patient with blood culture resulted as below. Patient otherwise HD stable    Culture - Blood (03.27.20 @ 00:57)    -  Coagulase negative Staphylococcus: Detec    Gram Stain:   Growth in aerobic bottle: Gram Positive Cocci in Clusters    Specimen Source: .Blood Blood    Organism: Blood Culture PCR    Culture Results:   Growth in aerobic bottle: Gram Positive Cocci in Clusters  ***Blood Panel PCR results on this specimen are available  approximately 3 hours after the Gram stain result.***  Gram stain, PCR, and/or culture results may not always  correspond due to difference in methodologies.  ************************************************************  This PCR assay was performed using [employer].  The following targets are tested for: Enterococcus,  vancomycin resistant enterococci, Listeria monocytogenes,  coagulase negative staphylococci, S. aureus,  methicillin resistant S. aureus, Streptococcus agalactiae  (Group B), S. pneumoniae, S. pyogenes (Group A),  Acinetobacter baumannii, Enterobacter cloacae, E. coli,  Klebsiella oxytoca, K. pneumoniae, Proteus sp.,  Serratia marcescens, Haemophilus influenzae,  Neisseria meningitidis, Pseudomonas aeruginosa, Candida  albicans, C. glabrata, C krusei, C parapsilosis,  C. tropicalis and the KPC resistance gene.    Organism Identification: Blood Culture PCR    Method Type: PCR    -Likely contaminated culture. Will repeat blood cultures to ensure no active infection.  -Will continue to monitor temp curve.  -Will endorse above events to day team.  -Attending to follow in AM.    -Leobardo Santos PA-C, [id number], Dept of Medicine

## 2020-03-28 NOTE — PROGRESS NOTE ADULT - SUBJECTIVE AND OBJECTIVE BOX
no  complaints    REVIEW OF SYSTEMS:  GEN: no fever,    no chills  RESP: no SOB,   no cough  CVS: no chest pain,   no palpitations  GI: no abdominal pain,   no nausea,   no vomiting,   no constipation,   no diarrhea  : no dysuria,   no frequency  NEURO: no headache,   no dizziness  PSYCH: no depression,   not anxious  Derm : no rash    MEDICATIONS  (STANDING):  ATENolol  Tablet 25 milliGRAM(s) Oral daily  enoxaparin Injectable 40 milliGRAM(s) SubCutaneous daily  loratadine 10 milliGRAM(s) Oral at bedtime  polyethylene glycol 3350 17 Gram(s) Oral daily  senna 2 Tablet(s) Oral at bedtime    MEDICATIONS  (PRN):  acetaminophen   Tablet .. 650 milliGRAM(s) Oral every 8 hours PRN Mild Pain (1 - 3)  oxyCODONE    IR 5 milliGRAM(s) Oral every 6 hours PRN Moderate Pain (4 - 6)      Vital Signs Last 24 Hrs  T(C): 36.8 (28 Mar 2020 04:27), Max: 37.9 (27 Mar 2020 11:40)  T(F): 98.2 (28 Mar 2020 04:27), Max: 100.2 (27 Mar 2020 11:40)  HR: 70 (28 Mar 2020 04:27) (67 - 78)  BP: 130/60 (28 Mar 2020 04:27) (113/71 - 133/70)  BP(mean): --  RR: 18 (28 Mar 2020 04:27) (18 - 19)  SpO2: 97% (28 Mar 2020 04:27) (95% - 98%)  CAPILLARY BLOOD GLUCOSE        I&O's Summary    27 Mar 2020 07:01  -  28 Mar 2020 07:00  --------------------------------------------------------  IN: 120 mL / OUT: 0 mL / NET: 120 mL        PHYSICAL EXAM:  HEAD:  Atraumatic, Normocephalic  NECK: Supple, No   JVD  CHEST/LUNG:   no     rales,     no,    rhonchi  HEART: Regular rate and rhythm;         murmur  ABDOMEN: Soft, Nontender, ;   EXTREMITIES:    no    edema  NEUROLOGY:  alert    LABS:                        10.7   5.03  )-----------( 143      ( 28 Mar 2020 09:09 )             33.5         135  |  100  |  17  ----------------------------<  106<H>  4.0   |  23  |  0.94    Ca    9.2      27 Mar 2020 07:02  Phos  3.3       Mg     1.6         TPro  6.4  /  Alb  3.7  /  TBili  0.9  /  DBili  x   /  AST  52<H>  /  ALT  47<H>  /  AlkPhos  47      PT/INR - ( 27 Mar 2020 08:34 )   PT: 13.0 sec;   INR: 1.13 ratio         PTT - ( 27 Mar 2020 08:34 )  PTT:34.6 sec      Urinalysis Basic - ( 26 Mar 2020 22:01 )    Color: Yellow / Appearance: Clear / S.018 / pH: x  Gluc: x / Ketone: Trace  / Bili: Negative / Urobili: Negative   Blood: x / Protein: 30 mg/dL / Nitrite: Negative   Leuk Esterase: Negative / RBC: 1 /hpf / WBC 0 /HPF   Sq Epi: x / Non Sq Epi: 1 /hpf / Bacteria: Negative           @ 22:01  3.9  26              Consultant(s) Notes Reviewed:      Care Discussed with Consultants/Other Providers:

## 2020-03-28 NOTE — PROGRESS NOTE ADULT - SUBJECTIVE AND OBJECTIVE BOX
CARDIOLOGY     PROGRESS  NOTE   ________________________________________________    CHIEF COMPLAINT:Patient is a 79y old  Female who presents with a chief complaint of left hip pain, fall (28 Mar 2020 09:33)  +hip pain  	  REVIEW OF SYSTEMS:  CONSTITUTIONAL: No fever, weight loss, or fatigue  EYES: No eye pain, visual disturbances, or discharge  ENT:  No difficulty hearing, tinnitus, vertigo; No sinus or throat pain  NECK: No pain or stiffness  RESPIRATORY: No cough, wheezing, chills or hemoptysis; No Shortness of Breath  CARDIOVASCULAR: No chest pain, palpitations, passing out, dizziness, or leg swelling  GASTROINTESTINAL: No abdominal or epigastric pain. No nausea, vomiting, or hematemesis; No diarrhea or constipation. No melena or hematochezia.  GENITOURINARY: No dysuria, frequency, hematuria, or incontinence  NEUROLOGICAL: No headaches, memory loss, loss of strength, numbness, or tremors  SKIN: No itching, burning, rashes, or lesions   LYMPH Nodes: No enlarged glands  ENDOCRINE: No heat or cold intolerance; No hair loss  MUSCULOSKELETAL: No joint pain or swelling; No muscle, back, or extremity pain, +hip pain.  PSYCHIATRIC: No depression, anxiety, mood swings, or difficulty sleeping  HEME/LYMPH: No easy bruising, or bleeding gums  ALLERGY AND IMMUNOLOGIC: No hives or eczema	    [ ] All others negative	  [ ] Unable to obtain    PHYSICAL EXAM:  T(C): 36.8 (03-28-20 @ 04:27), Max: 37.9 (03-27-20 @ 11:40)  HR: 70 (03-28-20 @ 04:27) (67 - 78)  BP: 130/60 (03-28-20 @ 04:27) (113/71 - 133/70)  RR: 18 (03-28-20 @ 04:27) (18 - 19)  SpO2: 97% (03-28-20 @ 04:27) (95% - 98%)  Wt(kg): --  I&O's Summary    27 Mar 2020 07:01  -  28 Mar 2020 07:00  --------------------------------------------------------  IN: 120 mL / OUT: 0 mL / NET: 120 mL        Appearance: Normal	  HEENT:   Normal oral mucosa, PERRL, EOMI	  Lymphatic: No lymphadenopathy  Cardiovascular: Normal S1 S2, No JVD, + murmurs, No edema  Respiratory: Lungs clear to auscultation	  Psychiatry: A & O x 3, Mood & affect appropriate  Gastrointestinal:  Soft, Non-tender, + BS	  Skin: No rashes, No ecchymoses, No cyanosis	  Neurologic: Non-focal  Extremities: Normal range of motion, No clubbing, cyanosis or edema  Vascular: Peripheral pulses palpable 2+ bilaterally    MEDICATIONS  (STANDING):  ATENolol  Tablet 25 milliGRAM(s) Oral daily  enoxaparin Injectable 40 milliGRAM(s) SubCutaneous daily  loratadine 10 milliGRAM(s) Oral at bedtime  polyethylene glycol 3350 17 Gram(s) Oral daily  senna 2 Tablet(s) Oral at bedtime      TELEMETRY: 	    ECG:  	  RADIOLOGY:  OTHER: 	  	  LABS:	 	    CARDIAC MARKERS:                                10.7   5.03  )-----------( 143      ( 28 Mar 2020 09:09 )             33.5     03-27    135  |  100  |  17  ----------------------------<  106<H>  4.0   |  23  |  0.94    Ca    9.2      27 Mar 2020 07:02  Phos  3.3     03-27  Mg     1.6     03-27    TPro  6.4  /  Alb  3.4  /  TBili  0.6  /  DBili  0.1  /  AST  72<H>  /  ALT  65<H>  /  AlkPhos  42  03-28    proBNP:   Lipid Profile:   HgA1c:   TSH:   PT/INR - ( 27 Mar 2020 08:34 )   PT: 13.0 sec;   INR: 1.13 ratio         PTT - ( 27 Mar 2020 08:34 )  PTT:34.6 sec  < from: CT Knee No Cont, Left (03.27.20 @ 14:12) >  No acute fracture or dislocation. Mild tricompartmental joint space narrowing. There is chondrocalcinosis. Focal benign  lucency is visualized in the inner posterior aspect of the medial femoral condyle appears to contain fat and thinning of the overlying cortex, most consistent with enthesopathy and osteopenia. No muscle atrophy. Vascularcalcifications. Subcutaneous tissues are intact.    IMPRESSION:    No fracture.    < end of copied text >      Assessment and plan  ---------------------------  79F c hx HTN, right hip replacement, pw 1 month of dry cough, and fall c/b left hip pain and inability to ambulate.  Pt states she has had a dry cough for about 1 month, but was otherwise in her usual state of health until 3 days ago, when she was walking in her home and suddenly fell backwards. Pt isn't sure what caused the fall, but pt denies any pre-syncope and LOC at the time. Pt states she fell onto her left hip, and pt immediately called out to her , who helped pt to the bed. Pt states she was unable to get up on her own at that time and has not been able to ambulate since then. Pt denies head trauma. Pt states she did not know she had a fever until arriving in the Ed, where she noticed she was also having chills. Other ROS as below.  pt with known hx of htn ?cad, s/p fall , ?syncope.  pt with no hx of arrythmia but is been  taking few meds for bp control, including HCTZ.  no chest pain.  s/p fall syncope  dc all bp meds including HCTZ  chest ct  ?r/o COVID  dvt prophylaxis  iv hydration  awaiting ecg

## 2020-03-28 NOTE — PHYSICAL THERAPY INITIAL EVALUATION ADULT - PERTINENT HX OF CURRENT PROBLEM, REHAB EVAL
79F c hx HTN, right hip replacement, pw 1 month of dry cough, and fall c/b left hip pain and inability to ambulate. pt found to have  left inferior pubic rami, left anterior column fracture. No surgical intervention at this time

## 2020-03-28 NOTE — PHYSICAL THERAPY INITIAL EVALUATION ADULT - ADDITIONAL COMMENTS
PTA pt was independent with functional mobility and ADLs with RW. Pt lives in a  with her spouse 3 steps to enter, 1st floor set up inside.

## 2020-03-28 NOTE — PROGRESS NOTE ADULT - ASSESSMENT
79F        hx HTN, right hip replacement,    admitted   with   dry cough,, , low  grade fever,   and left hip pain after fall   ct pelvis, Left acetabular fx/ Left inf pubic  ramus  fx   xray knee. ?  fx, left tibia    HTN, on atenolol/  stopped  diuretic  febrile,  RVP  and COVID  19.. negative/ cxr, clear  blood  and urine  cs/,   negative   ID d r Jiang  mild  elevated lfts   ortho  eval/  no  intrevention  pain meds/ laxatives plan.   d/c  to rehab/ if hb stable   rpt hb  now, pending   dvt ppx/ PT eval     < from: CT 3D Reconstruct w/ Workstation (03.27.20 @ 00:32) >  es are noted in the anterior right hip.  IMPRESSION:  1. Acute, nondisplaced fracture of the anterior left acetabulum.  2. Acute, nondisplaced of the left inferior pubic ramus.  < end of copied text >     < from: Xray Knee 3 Views, Left (03.26.20 @ 22:24) >  IMPRESSION:  There is a lucency at the anterolateral aspect of the tibia best seen on the frontal x-ray of the left knee. Evaluation is limited secondary to overlying material, however this may represent a fracture. Correlate with point tenderness.. CT of the knee can be obtained if clinically indicated  < end of copied text >

## 2020-03-29 LAB
ALBUMIN SERPL ELPH-MCNC: 3.3 G/DL — SIGNIFICANT CHANGE UP (ref 3.3–5)
ALP SERPL-CCNC: 40 U/L — SIGNIFICANT CHANGE UP (ref 40–120)
ALT FLD-CCNC: 51 U/L — HIGH (ref 10–45)
AST SERPL-CCNC: 47 U/L — HIGH (ref 10–40)
BILIRUB DIRECT SERPL-MCNC: 0.1 MG/DL — SIGNIFICANT CHANGE UP (ref 0–0.2)
BILIRUB INDIRECT FLD-MCNC: 0.6 MG/DL — SIGNIFICANT CHANGE UP (ref 0.2–1)
BILIRUB SERPL-MCNC: 0.7 MG/DL — SIGNIFICANT CHANGE UP (ref 0.2–1.2)
CULTURE RESULTS: SIGNIFICANT CHANGE UP
ORGANISM # SPEC MICROSCOPIC CNT: SIGNIFICANT CHANGE UP
ORGANISM # SPEC MICROSCOPIC CNT: SIGNIFICANT CHANGE UP
PROT SERPL-MCNC: 6.4 G/DL — SIGNIFICANT CHANGE UP (ref 6–8.3)
SPECIMEN SOURCE: SIGNIFICANT CHANGE UP

## 2020-03-29 PROCEDURE — 99232 SBSQ HOSP IP/OBS MODERATE 35: CPT

## 2020-03-29 RX ORDER — ZOLPIDEM TARTRATE 10 MG/1
5 TABLET ORAL ONCE
Refills: 0 | Status: DISCONTINUED | OUTPATIENT
Start: 2020-03-29 | End: 2020-03-29

## 2020-03-29 RX ORDER — NYSTATIN CREAM 100000 [USP'U]/G
1 CREAM TOPICAL
Refills: 0 | Status: DISCONTINUED | OUTPATIENT
Start: 2020-03-29 | End: 2020-04-02

## 2020-03-29 RX ORDER — ZOLPIDEM TARTRATE 10 MG/1
5 TABLET ORAL AT BEDTIME
Refills: 0 | Status: DISCONTINUED | OUTPATIENT
Start: 2020-03-29 | End: 2020-03-29

## 2020-03-29 RX ADMIN — ENOXAPARIN SODIUM 40 MILLIGRAM(S): 100 INJECTION SUBCUTANEOUS at 12:46

## 2020-03-29 RX ADMIN — ATENOLOL 25 MILLIGRAM(S): 25 TABLET ORAL at 10:19

## 2020-03-29 RX ADMIN — ZOLPIDEM TARTRATE 5 MILLIGRAM(S): 10 TABLET ORAL at 01:25

## 2020-03-29 RX ADMIN — ZOLPIDEM TARTRATE 5 MILLIGRAM(S): 10 TABLET ORAL at 21:54

## 2020-03-29 RX ADMIN — POLYETHYLENE GLYCOL 3350 17 GRAM(S): 17 POWDER, FOR SOLUTION ORAL at 12:46

## 2020-03-29 RX ADMIN — NYSTATIN CREAM 1 APPLICATION(S): 100000 CREAM TOPICAL at 15:20

## 2020-03-29 NOTE — PROGRESS NOTE ADULT - SUBJECTIVE AND OBJECTIVE BOX
Infectious Diseases progress note:    Subjective: No acute o/n events.  Afebrile.  No leukocytosis.  Bcx single set isolate CNS, likely contaminant.  Pt denies f/c/cough/abd pain/diarrhea    ROS:  CONSTITUTIONAL:  No fever, chills, rigors  CARDIOVASCULAR:  No chest pain or palpitations  RESPIRATORY:   No SOB, cough, dyspnea on exertion.  No wheezing  GASTROINTESTINAL:  No abd pain, N/V, diarrhea/constipation  EXTREMITIES:  No swelling or joint pain  GENITOURINARY:  No burning on urination, increased frequency or urgency.  No flank pain  NEUROLOGIC:  No HA, visual disturbances  SKIN: No rashes    Allergies    Cipro (Nausea)  contrast media (gadolinium-based) (Faint)  Minocin (Nausea)    Intolerances        ANTIBIOTICS/RELEVANT:  antimicrobials    immunologic:    OTHER:  acetaminophen   Tablet .. 650 milliGRAM(s) Oral every 8 hours PRN  ATENolol  Tablet 25 milliGRAM(s) Oral daily  enoxaparin Injectable 40 milliGRAM(s) SubCutaneous daily  loratadine 10 milliGRAM(s) Oral at bedtime  oxyCODONE    IR 5 milliGRAM(s) Oral every 6 hours PRN  polyethylene glycol 3350 17 Gram(s) Oral daily  senna 2 Tablet(s) Oral at bedtime      Objective:  Vital Signs Last 24 Hrs  T(C): 37.1 (29 Mar 2020 10:14), Max: 37.4 (28 Mar 2020 19:35)  T(F): 98.7 (29 Mar 2020 10:14), Max: 99.4 (28 Mar 2020 19:35)  HR: 74 (29 Mar 2020 10:14) (65 - 75)  BP: 117/65 (29 Mar 2020 10:14) (112/78 - 132/74)  BP(mean): --  RR: 18 (29 Mar 2020 10:14) (17 - 18)  SpO2: 96% (29 Mar 2020 10:14) (96% - 99%)    PHYSICAL EXAM:  Constitutional:NAD  Eyes:KADIE, EOMI  Ear/Nose/Throat: no thrush, mucositis.  Moist mucous membranes	  Neck:no JVD, no lymphadenopathy, supple  Respiratory: CTA ra  Cardiovascular: S1S2 RRR, no murmurs  Gastrointestinal:soft, nontender,  nondistended (+) BS  Extremities:no e/e/c  Skin:  no rashes, open wounds or ulcerations        LABS:                        11.2   5.20  )-----------( 148      ( 28 Mar 2020 11:01 )             35.1         TPro  6.4  /  Alb  3.3  /  TBili  0.7  /  DBili  0.1  /  AST  47<H>  /  ALT  51<H>  /  AlkPhos  40  03-29                    Rapid RVP Result: NotDetec          MICROBIOLOGY:    Culture - Blood (03.27.20 @ 00:57)    Specimen Source: .Blood Blood    Culture Results:   No growth to date.    Culture - Blood (03.27.20 @ 00:57)    -  Coagulase negative Staphylococcus: Detec    Gram Stain:   Growth in aerobic bottle: Gram Positive Cocci in Clusters    Specimen Source: .Blood Blood    Organism: Blood Culture PCR    Culture Results:   Growth in aerobic bottle: Gram Positive Cocci in Clusters  ***Blood Panel PCR results on this specimen are available  approximately 3 hours after the Gram stain result.***  Gram stain, PCR, and/or culture results may not always  correspond due to difference in methodologies.  ************************************************************  This PCR assay was performed using [employer].  The following targets are tested for: Enterococcus,  vancomycin resistant enterococci, Listeria monocytogenes,  coagulase negative staphylococci, S. aureus,  methicillin resistant S. aureus, Streptococcus agalactiae  (Group B), S. pneumoniae, S. pyogenes (Group A),  Acinetobacter baumannii, Enterobacter cloacae, E. coli,  Klebsiella oxytoca, K. pneumoniae, Proteus sp.,  Serratia marcescens, Haemophilus influenzae,  Neisseria meningitidis, Pseudomonas aeruginosa, Candida  albicans, C. glabrata, C krusei, C parapsilosis,  C. tropicalis and the KPC resistance gene.    Organism Identification: Blood Culture PCR    Method Type: PCR    Culture - Urine (03.27.20 @ 00:37)    Specimen Source: .Urine Clean Catch (Midstream)    Culture Results:   No growth    COVID-19 PCR . (03.27.20 @ 06:10)    COVID-19 PCR: NotDetec: As with all clinical testing, results should be correlated with clinical  findings.  This test has been validated by [employer] to be accurate;  though it has not been FDA cleared/approved by the usual pathway.  As with all laboratory tests, results should be correlated with clinical  findings.    COVID-19 PCR . (03.26.20 @ 20:32)    COVID-19 PCR: NotDetec: As with all clinical testing, results should be correlated with clinical  findings.  This test has been validated by [employer] to be accurate;  though it has not been FDA cleared/approved by the usual pathway.  As with all laboratory tests, results should be correlated with clinical  findings.          RADIOLOGY & ADDITIONAL STUDIES:    < from: Xray Pelvis 3 Views (03.27.20 @ 14:49) >  IMPRESSION:   There is an acute nondisplaced fracture of the left inferior pubic ramus.. Left acetabular fracture is not well visualized, better seen on CT. No dislocation. Right hip arthroplasty is intact.    < end of copied text > Infectious Diseases progress note:    Subjective: No acute o/n events.  Afebrile.  No leukocytosis.  Bcx single set isolate CNS, likely contaminant.  Pt denies f/c/cough/abd pain/diarrhea    ROS:  CONSTITUTIONAL:  No fever, chills, rigors  CARDIOVASCULAR:  No chest pain or palpitations  RESPIRATORY:   No SOB, cough, dyspnea on exertion.  No wheezing  GASTROINTESTINAL:  No abd pain, N/V, diarrhea/constipation  EXTREMITIES:  No swelling or joint pain  GENITOURINARY:  No burning on urination, increased frequency or urgency.  No flank pain  NEUROLOGIC:  No HA, visual disturbances  SKIN: No rashes    Allergies    Cipro (Nausea)  contrast media (gadolinium-based) (Faint)  Minocin (Nausea)    Intolerances        ANTIBIOTICS/RELEVANT:  antimicrobials    immunologic:    OTHER:  acetaminophen   Tablet .. 650 milliGRAM(s) Oral every 8 hours PRN  ATENolol  Tablet 25 milliGRAM(s) Oral daily  enoxaparin Injectable 40 milliGRAM(s) SubCutaneous daily  loratadine 10 milliGRAM(s) Oral at bedtime  oxyCODONE    IR 5 milliGRAM(s) Oral every 6 hours PRN  polyethylene glycol 3350 17 Gram(s) Oral daily  senna 2 Tablet(s) Oral at bedtime      Objective:  Vital Signs Last 24 Hrs  T(C): 37.1 (29 Mar 2020 10:14), Max: 37.4 (28 Mar 2020 19:35)  T(F): 98.7 (29 Mar 2020 10:14), Max: 99.4 (28 Mar 2020 19:35)  HR: 74 (29 Mar 2020 10:14) (65 - 75)  BP: 117/65 (29 Mar 2020 10:14) (112/78 - 132/74)  BP(mean): --  RR: 18 (29 Mar 2020 10:14) (17 - 18)  SpO2: 96% (29 Mar 2020 10:14) (96% - 99%)    PHYSICAL EXAM:  Constitutional:NAD  Eyes:KADIE, EOMI  Ear/Nose/Throat: no thrush, mucositis.  Moist mucous membranes	  Neck:no JVD, no lymphadenopathy, supple  Respiratory: CTA ra  Cardiovascular: S1S2 RRR, no murmurs  Gastrointestinal:soft, nontender,  nondistended (+) BS  Extremities:no e/e/c  Skin:  erythematous rash beneath b/l breasts        LABS:                        11.2   5.20  )-----------( 148      ( 28 Mar 2020 11:01 )             35.1         TPro  6.4  /  Alb  3.3  /  TBili  0.7  /  DBili  0.1  /  AST  47<H>  /  ALT  51<H>  /  AlkPhos  40  03-29                    Rapid RVP Result: NotDetec          MICROBIOLOGY:    Culture - Blood (03.27.20 @ 00:57)    Specimen Source: .Blood Blood    Culture Results:   No growth to date.    Culture - Blood (03.27.20 @ 00:57)    -  Coagulase negative Staphylococcus: Detec    Gram Stain:   Growth in aerobic bottle: Gram Positive Cocci in Clusters    Specimen Source: .Blood Blood    Organism: Blood Culture PCR    Culture Results:   Growth in aerobic bottle: Gram Positive Cocci in Clusters  ***Blood Panel PCR results on this specimen are available  approximately 3 hours after the Gram stain result.***  Gram stain, PCR, and/or culture results may not always  correspond due to difference in methodologies.  ************************************************************  This PCR assay was performed using [employer].  The following targets are tested for: Enterococcus,  vancomycin resistant enterococci, Listeria monocytogenes,  coagulase negative staphylococci, S. aureus,  methicillin resistant S. aureus, Streptococcus agalactiae  (Group B), S. pneumoniae, S. pyogenes (Group A),  Acinetobacter baumannii, Enterobacter cloacae, E. coli,  Klebsiella oxytoca, K. pneumoniae, Proteus sp.,  Serratia marcescens, Haemophilus influenzae,  Neisseria meningitidis, Pseudomonas aeruginosa, Candida  albicans, C. glabrata, C krusei, C parapsilosis,  C. tropicalis and the KPC resistance gene.    Organism Identification: Blood Culture PCR    Method Type: PCR    Culture - Urine (03.27.20 @ 00:37)    Specimen Source: .Urine Clean Catch (Midstream)    Culture Results:   No growth    COVID-19 PCR . (03.27.20 @ 06:10)    COVID-19 PCR: NotDetec: As with all clinical testing, results should be correlated with clinical  findings.  This test has been validated by [email or web address] to be accurate;  though it has not been FDA cleared/approved by the usual pathway.  As with all laboratory tests, results should be correlated with clinical  findings.    COVID-19 PCR . (03.26.20 @ 20:32)    COVID-19 PCR: NotDetec: As with all clinical testing, results should be correlated with clinical  findings.  This test has been validated by [email or web address] to be accurate;  though it has not been FDA cleared/approved by the usual pathway.  As with all laboratory tests, results should be correlated with clinical  findings.          RADIOLOGY & ADDITIONAL STUDIES:    < from: Xray Pelvis 3 Views (03.27.20 @ 14:49) >  IMPRESSION:   There is an acute nondisplaced fracture of the left inferior pubic ramus.. Left acetabular fracture is not well visualized, better seen on CT. No dislocation. Right hip arthroplasty is intact.    < end of copied text >

## 2020-03-29 NOTE — PROGRESS NOTE ADULT - SUBJECTIVE AND OBJECTIVE BOX
CARDIOLOGY     PROGRESS  NOTE   ________________________________________________    CHIEF COMPLAINT:Patient is a 79y old  Female who presents with a chief complaint of left hip pain, fall (29 Mar 2020 09:49)  doing better.  	  REVIEW OF SYSTEMS:  CONSTITUTIONAL: No fever, weight loss, or fatigue  EYES: No eye pain, visual disturbances, or discharge  ENT:  No difficulty hearing, tinnitus, vertigo; No sinus or throat pain  NECK: No pain or stiffness  RESPIRATORY: No cough, wheezing, chills or hemoptysis; No Shortness of Breath  CARDIOVASCULAR: No chest pain, palpitations, passing out, dizziness, or leg swelling  GASTROINTESTINAL: No abdominal or epigastric pain. No nausea, vomiting, or hematemesis; No diarrhea or constipation. No melena or hematochezia.  GENITOURINARY: No dysuria, frequency, hematuria, or incontinence  NEUROLOGICAL: No headaches, memory loss, loss of strength, numbness, or tremors  SKIN: No itching, burning, rashes, or lesions   LYMPH Nodes: No enlarged glands  ENDOCRINE: No heat or cold intolerance; No hair loss  MUSCULOSKELETAL: No joint pain or swelling; No muscle, back, or extremity pain  PSYCHIATRIC: No depression, anxiety, mood swings, or difficulty sleeping  HEME/LYMPH: No easy bruising, or bleeding gums  ALLERGY AND IMMUNOLOGIC: No hives or eczema	    [ ] All others negative	  [ ] Unable to obtain    PHYSICAL EXAM:  T(C): 37.1 (03-29-20 @ 10:14), Max: 37.4 (03-28-20 @ 19:35)  HR: 74 (03-29-20 @ 10:14) (65 - 75)  BP: 117/65 (03-29-20 @ 10:14) (106/65 - 132/74)  RR: 18 (03-29-20 @ 10:14) (17 - 18)  SpO2: 96% (03-29-20 @ 10:14) (94% - 99%)  Wt(kg): --  I&O's Summary    28 Mar 2020 07:01  -  29 Mar 2020 07:00  --------------------------------------------------------  IN: 1200 mL / OUT: 450 mL / NET: 750 mL        Appearance: Normal	  HEENT:   Normal oral mucosa, PERRL, EOMI	  Lymphatic: No lymphadenopathy  Cardiovascular: Normal S1 S2, No JVD, + murmurs, No edema  Respiratory: Lungs clear to auscultation	  Psychiatry: A & O x 3, Mood & affect appropriate  Gastrointestinal:  Soft, Non-tender, + BS	  Skin: No rashes, No ecchymoses, No cyanosis	  Neurologic: Non-focal  Extremities: Normal range of motion, No clubbing, cyanosis or edema  Vascular: Peripheral pulses palpable 2+ bilaterally    MEDICATIONS  (STANDING):  ATENolol  Tablet 25 milliGRAM(s) Oral daily  enoxaparin Injectable 40 milliGRAM(s) SubCutaneous daily  loratadine 10 milliGRAM(s) Oral at bedtime  polyethylene glycol 3350 17 Gram(s) Oral daily  senna 2 Tablet(s) Oral at bedtime      TELEMETRY: 	    ECG:  	  RADIOLOGY:  OTHER: 	  	  LABS:	 	    CARDIAC MARKERS:                                11.2   5.20  )-----------( 148      ( 28 Mar 2020 11:01 )             35.1         TPro  6.4  /  Alb  3.3  /  TBili  0.7  /  DBili  0.1  /  AST  47<H>  /  ALT  51<H>  /  AlkPhos  40  03-29    proBNP:   Lipid Profile:   HgA1c:   TSH:   COVID-19 PCR . (03.27.20 @ 06:10)    COVID-19 PCR: NotDetec: As with all clinical testing, results should be correlated with clinical  findings.  This test has been validated by [employer] to be accurate;  though it has not been FDA cleared/approved by the usual pathway.  As with all laboratory tests, results should be correlated with clinical  findings.          Assessment and plan  ---------------------------  79F c hx HTN, right hip replacement, pw 1 month of dry cough, and fall c/b left hip pain and inability to ambulate.  Pt states she has had a dry cough for about 1 month, but was otherwise in her usual state of health until 3 days ago, when she was walking in her home and suddenly fell backwards. Pt isn't sure what caused the fall, but pt denies any pre-syncope and LOC at the time. Pt states she fell onto her left hip, and pt immediately called out to her , who helped pt to the bed. Pt states she was unable to get up on her own at that time and has not been able to ambulate since then. Pt denies head trauma. Pt states she did not know she had a fever until arriving in the Ed, where she noticed she was also having chills. Other ROS as below.  pt with known hx of htn ?cad, s/p fall , ?syncope.  pt with no hx of arrythmia but is been  taking few meds for bp control, including HCTZ.  no chest pain.  s/p fall syncope  dc all bp meds including HCTZ  chest ct, covid negative  dvt prophylaxis  iv hydration  awaiting ecg, ordered   asa daily

## 2020-03-29 NOTE — PROGRESS NOTE ADULT - ASSESSMENT
79F        hx HTN, right hip replacement,    admitted   with   dry cough,, , low  grade fever,   and left hip pain after fall   ct pelvis, Left acetabular fx/ Left inf pubic  ramus  fx   xray knee. ?  fx, left tibia    HTN, on atenolol/  stopped  diuretic  febrile,  RVP  and COVID  19.. negative/ cxr, clear   urine  cs/,   negative   ID d r Jiang  mild  elevated lfts, resolving   ortho  eval/  no  intervention   pain meds/ laxatives plan.   blod  c/s. 1/2  bottles  on 3/  27 with  coag neg  staph .  which is a contaminant   dvt ppx/ PT eval noted,   needs rehab   d/c   on monday/       < from: CT 3D Reconstruct w/ Workstation (03.27.20 @ 00:32) >  es are noted in the anterior right hip.  IMPRESSION:  1. Acute, nondisplaced fracture of the anterior left acetabulum.  2. Acute, nondisplaced of the left inferior pubic ramus.  < end of copied text >     < from: Xray Knee 3 Views, Left (03.26.20 @ 22:24) >  IMPRESSION:  There is a lucency at the anterolateral aspect of the tibia best seen on the frontal x-ray of the left knee. Evaluation is limited secondary to overlying material, however this may represent a fracture. Correlate with point tenderness.. CT of the knee can be obtained if clinically indicated  < end of copied text >

## 2020-03-29 NOTE — PROGRESS NOTE ADULT - ASSESSMENT
79F c hx HTN, right hip replacement, pw 1 month of dry cough, and fall c/b left hip pain and inability to ambulate.    Pt states she has had a dry cough for about 1 month, but was otherwise in her usual state of health until 3 days ago, when she was walking in her home and suddenly fell backwards. Pt isn't sure what caused the fall, but pt denies any pre-syncope and LOC at the time. Pt states she fell onto her left hip, and pt immediately called out to her , who helped pt to the bed. Pt states she was unable to get up on her own at that time and has not been able to ambulate since then. Pt denies head trauma. Pt states she did not know she had a fever until arriving in the Ed, where she noticed she was also having chills. Other ROS as below.    VS: Tm 102.9, P 106, /56, R 20, 95% RA  In the Ed, received ceftriaxone, 1.5L, tylenol (27 Mar 2020 02:44)    WBC nl. UA (-) LE/nit.   RVP (-), Covid PCR (-) x 2.  Cxr clear.  CT pelvis shows acute, nondisplaced fracture of the anterior left acetabulum and  left inferior pubic ramus.  Xray showing possible left tibial plateau fx, but ortho does not think it is.  No acute intervention at this time.  Pt received rocephin x 1 in ER.  Being observed off abx.  ID  called for evaluation of fever.        Fever:    - Pt p/w fall and found to have nondisplaced fracture L acetabulum and pubic ramus.  No orthopedic intervention.    - Pt has chronic dry cough/post nasal drip.  RVP and Covid (-) x 2.  Cxr clear, no evidence for pna.     - UA (-) UCx (-)  Blood cultures (-)    - LFTs mildly elevated on admission, now trending down.     - Agree with monitoring off abx.      CoNS bacteremia:    - Single set isolate (+), likely contaminant.  Repeat bcx sent, testing.  No need for abx at this time.    Will follow,    Stefanie Jiang  [phone] 79F c hx HTN, right hip replacement, pw 1 month of dry cough, and fall c/b left hip pain and inability to ambulate.    Pt states she has had a dry cough for about 1 month, but was otherwise in her usual state of health until 3 days ago, when she was walking in her home and suddenly fell backwards. Pt isn't sure what caused the fall, but pt denies any pre-syncope and LOC at the time. Pt states she fell onto her left hip, and pt immediately called out to her , who helped pt to the bed. Pt states she was unable to get up on her own at that time and has not been able to ambulate since then. Pt denies head trauma. Pt states she did not know she had a fever until arriving in the Ed, where she noticed she was also having chills. Other ROS as below.    VS: Tm 102.9, P 106, /56, R 20, 95% RA  In the Ed, received ceftriaxone, 1.5L, tylenol (27 Mar 2020 02:44)    WBC nl. UA (-) LE/nit.   RVP (-), Covid PCR (-) x 2.  Cxr clear.  CT pelvis shows acute, nondisplaced fracture of the anterior left acetabulum and  left inferior pubic ramus.  Xray showing possible left tibial plateau fx, but ortho does not think it is.  No acute intervention at this time.  Pt received rocephin x 1 in ER.  Being observed off abx.  ID  called for evaluation of fever.        Fever:    - Pt p/w fall and found to have nondisplaced fracture L acetabulum and pubic ramus.  No orthopedic intervention.    - Pt has chronic dry cough/post nasal drip.  RVP and Covid (-) x 2.  Cxr clear, no evidence for pna.     - UA (-) UCx (-)  Blood cultures (-)    - LFTs mildly elevated on admission, now trending down.     - Agree with monitoring off abx.      CoNS bacteremia:    - Single set isolate (+), likely contaminant.  Repeat bcx sent, testing.  No need for abx at this time.      * c/o rash beneath b/l breasts.  Start nystatin powder for candidiasis    Will follow,    Stefanie Jiang  [phone]

## 2020-03-29 NOTE — PROGRESS NOTE ADULT - SUBJECTIVE AND OBJECTIVE BOX
retsing/  no complaints  REVIEW OF SYSTEMS:  GEN: no fever,    no chills  RESP: no SOB,   no cough  CVS: no chest pain,   no palpitations  GI: no abdominal pain,   no nausea,   no vomiting,   no constipation,   no diarrhea  : no dysuria,   no frequency  NEURO: no headache,   no dizziness  PSYCH: no depression,   not anxious  Derm : no rash    MEDICATIONS  (STANDING):  ATENolol  Tablet 25 milliGRAM(s) Oral daily  enoxaparin Injectable 40 milliGRAM(s) SubCutaneous daily  loratadine 10 milliGRAM(s) Oral at bedtime  polyethylene glycol 3350 17 Gram(s) Oral daily  senna 2 Tablet(s) Oral at bedtime    MEDICATIONS  (PRN):  acetaminophen   Tablet .. 650 milliGRAM(s) Oral every 8 hours PRN Mild Pain (1 - 3)  oxyCODONE    IR 5 milliGRAM(s) Oral every 6 hours PRN Moderate Pain (4 - 6)      Vital Signs Last 24 Hrs  T(C): 37.2 (29 Mar 2020 06:09), Max: 37.4 (28 Mar 2020 19:35)  T(F): 99 (29 Mar 2020 06:09), Max: 99.4 (28 Mar 2020 19:35)  HR: 65 (29 Mar 2020 06:09) (65 - 75)  BP: 112/78 (29 Mar 2020 06:09) (106/65 - 132/74)  BP(mean): --  RR: 18 (29 Mar 2020 06:09) (17 - 18)  SpO2: 99% (29 Mar 2020 06:09) (94% - 99%)  CAPILLARY BLOOD GLUCOSE        I&O's Summary    28 Mar 2020 07:01  -  29 Mar 2020 07:00  --------------------------------------------------------  IN: 1200 mL / OUT: 450 mL / NET: 750 mL        PHYSICAL EXAM:  HEAD:  Atraumatic, Normocephalic  NECK: Supple, No   JVD  CHEST/LUNG:   no     rales,     no,    rhonchi  HEART: Regular rate and rhythm;         murmur  ABDOMEN: Soft, Nontender, ;   EXTREMITIES:     no   edema  NEUROLOGY:  alert    LABS:                        11.2   5.20  )-----------( 148      ( 28 Mar 2020 11:01 )             35.1         TPro  6.4  /  Alb  3.3  /  TBili  0.7  /  DBili  0.1  /  AST  47<H>  /  ALT  51<H>  /  AlkPhos  40  03-29                            Consultant(s) Notes Reviewed:      Care Discussed with Consultants/Other Providers:

## 2020-03-30 ENCOUNTER — TRANSCRIPTION ENCOUNTER (OUTPATIENT)
Age: 80
End: 2020-03-30

## 2020-03-30 PROCEDURE — 99232 SBSQ HOSP IP/OBS MODERATE 35: CPT

## 2020-03-30 RX ORDER — LOSARTAN POTASSIUM 100 MG/1
1 TABLET, FILM COATED ORAL
Qty: 0 | Refills: 0 | DISCHARGE

## 2020-03-30 RX ORDER — OXYCODONE HYDROCHLORIDE 5 MG/1
1 TABLET ORAL
Qty: 0 | Refills: 0 | DISCHARGE
Start: 2020-03-30

## 2020-03-30 RX ORDER — SENNA PLUS 8.6 MG/1
2 TABLET ORAL
Qty: 0 | Refills: 0 | DISCHARGE
Start: 2020-03-30

## 2020-03-30 RX ORDER — FEXOFENADINE HCL 30 MG
1 TABLET ORAL
Qty: 0 | Refills: 0 | DISCHARGE

## 2020-03-30 RX ORDER — SODIUM CHLORIDE 9 MG/ML
1000 INJECTION INTRAMUSCULAR; INTRAVENOUS; SUBCUTANEOUS
Refills: 0 | Status: DISCONTINUED | OUTPATIENT
Start: 2020-03-30 | End: 2020-03-31

## 2020-03-30 RX ORDER — NYSTATIN CREAM 100000 [USP'U]/G
1 CREAM TOPICAL
Qty: 0 | Refills: 0 | DISCHARGE
Start: 2020-03-30

## 2020-03-30 RX ORDER — ACETAMINOPHEN 500 MG
2 TABLET ORAL
Qty: 0 | Refills: 0 | DISCHARGE
Start: 2020-03-30

## 2020-03-30 RX ORDER — ENOXAPARIN SODIUM 100 MG/ML
40 INJECTION SUBCUTANEOUS
Qty: 0 | Refills: 0 | DISCHARGE
Start: 2020-03-30

## 2020-03-30 RX ORDER — LORATADINE 10 MG/1
1 TABLET ORAL
Qty: 0 | Refills: 0 | DISCHARGE
Start: 2020-03-30

## 2020-03-30 RX ORDER — ZOLPIDEM TARTRATE 10 MG/1
1 TABLET ORAL
Qty: 0 | Refills: 0 | DISCHARGE

## 2020-03-30 RX ORDER — POLYETHYLENE GLYCOL 3350 17 G/17G
17 POWDER, FOR SOLUTION ORAL
Qty: 0 | Refills: 0 | DISCHARGE
Start: 2020-03-30

## 2020-03-30 RX ORDER — ACETAMINOPHEN 500 MG
650 TABLET ORAL ONCE
Refills: 0 | Status: COMPLETED | OUTPATIENT
Start: 2020-03-30 | End: 2020-03-30

## 2020-03-30 RX ADMIN — NYSTATIN CREAM 1 APPLICATION(S): 100000 CREAM TOPICAL at 06:13

## 2020-03-30 RX ADMIN — NYSTATIN CREAM 1 APPLICATION(S): 100000 CREAM TOPICAL at 17:21

## 2020-03-30 RX ADMIN — SENNA PLUS 1 TABLET(S): 8.6 TABLET ORAL at 21:28

## 2020-03-30 RX ADMIN — ATENOLOL 25 MILLIGRAM(S): 25 TABLET ORAL at 11:32

## 2020-03-30 RX ADMIN — ENOXAPARIN SODIUM 40 MILLIGRAM(S): 100 INJECTION SUBCUTANEOUS at 11:32

## 2020-03-30 RX ADMIN — SODIUM CHLORIDE 100 MILLILITER(S): 9 INJECTION INTRAMUSCULAR; INTRAVENOUS; SUBCUTANEOUS at 22:22

## 2020-03-30 RX ADMIN — Medication 650 MILLIGRAM(S): at 22:21

## 2020-03-30 NOTE — DISCHARGE NOTE PROVIDER - HOSPITAL COURSE
79F c hx HTN, right hip replacement, admitted withp dry cough, and left hip pain after fall; COVID negative.    .Acute, nondisplaced fracture of the anterior left acetabulum.    Acute, nondisplaced of the left inferior pubic ramus.        seen by ortho- weight bearing as tolerated;    -no acute orthopedic surgical intervention indicated at this time    -FU with Dr. Crum once discharged, call office for an appt;    pt is cleared by attending MD for discharge to rehab 79F c hx HTN, right hip replacement, admitted with dry cough, and left hip pain after fall; COVID negative.    .Acute, nondisplaced fracture of the anterior left acetabulum.    Acute, nondisplaced of the left inferior pubic ramus.        seen by ortho- weight bearing as tolerated;    -no acute orthopedic surgical intervention indicated at this time    -FU with Dr. Crum once discharged, call office for an appt;    Patient is cleared by attending MD for discharge to rehab and will be leaving today for subacute rehab.

## 2020-03-30 NOTE — PROVIDER CONTACT NOTE (OTHER) - ASSESSMENT
pt initially  presented with a fever of 102.2, reassessed with temp of 100.1 and rectal temp of 101.5

## 2020-03-30 NOTE — PROGRESS NOTE ADULT - ASSESSMENT
79F        hx HTN, right hip replacement,    admitted   with   dry cough,, , low  grade fever,   and left hip pain after fall   ct pelvis, Left acetabular fx/ Left inf pubic  ramus  fx   xray knee. ?  fx, left tibia    HTN, on atenolol/  stopped  diuretic  febrile,  RVP  and COVID  19.. negative/ cxr, clear   urine  cs/,   negative   ID d r Jiang  mild  elevated lfts, resolving   ortho  eval/  no  intervention   pain meds/ laxatives plan.   blod  c/s. 1/2  bottles  on 3/  27 with  coag neg  staph .  which is a contaminant   dvt ppx/ PT eval noted,   needs rehab   d/c  today/  cleared for   d/c    np  aware     < from: CT 3D Reconstruct w/ Workstation (03.27.20 @ 00:32) >  es are noted in the anterior right hip.  IMPRESSION:  1. Acute, nondisplaced fracture of the anterior left acetabulum.  2. Acute, nondisplaced of the left inferior pubic ramus.  < end of copied text >     < from: Xray Knee 3 Views, Left (03.26.20 @ 22:24) >  IMPRESSION:  There is a lucency at the anterolateral aspect of the tibia best seen on the frontal x-ray of the left knee. Evaluation is limited secondary to overlying material, however this may represent a fracture. Correlate with point tenderness.. CT of the knee can be obtained if clinically indicated  < end of copied text >

## 2020-03-30 NOTE — DISCHARGE NOTE PROVIDER - CARE PROVIDER_API CALL
Elvis Crum (MD)  Orthopaedic Surgery  [address]  [place] [address]  Phone: [phone]  Fax: [phone]  Follow Up Time: Elvis Crum)  Orthopaedic Surgery  [address]  [place] [address]  Phone: [phone]  Fax: [phone]  Follow Up Time: 2 weeks    Jovani Harris)  Cardiovascular Disease; Internal Medicine  [address] [place] [id number]  Phone: [phone]  Fax: [phone]  Follow Up Time:

## 2020-03-30 NOTE — CHART NOTE - NSCHARTNOTEFT_GEN_A_CORE
Notified by RN to evaluate pt for low grade fever. Pt seen and evaluated at bedside. Pt denies any pain, urinary sx, abdominal pain, cough, SOB, chest pain, chills, or headache.     Vital Signs Last 24 Hrs  T(C): 38.6 (03-30-20 @ 21:46), Max: 39 (03-30-20 @ 20:20)  T(F): 101.5 (03-30-20 @ 21:46), Max: 102.2 (03-30-20 @ 20:20)  HR: 80 (03-30-20 @ 20:20) (78 - 82)  BP: 139/75 (03-30-20 @ 20:20) (135/65 - 140/87)  RR: 18 (03-30-20 @ 20:20) (18 - 18)  SpO2: 95% (03-30-20 @ 20:20) (93% - 97%)    Radiology:    PHYSICAL EXAM:  GENERAL: NAD, well-developed  ABDOMEN: Soft, Nontender, Nondistended  PSYCH: AAOx3    Assessment/Plan: HPI:  79F c hx HTN, right hip replacement, pw 1 month of dry cough, and fall c/b left hip pain and inability to ambulate. Found to have acetabulum and pubic ramus fx. Also c/o dry cough and fever s/p negative RVP, negative COVID, negative UA. Had + blood culture x1 on 3/27 but repeat bcx is pending d/t possible contamination. Now pt c/o low grade fever 101.5 (rectal)     # low grade fever   - UA reordered to r/o UTI  - waiting for bcx result   - Tylenol 650mg ordered   - Gentle fluid ordered 100ml/hr x 8 hr  - Will continue to monitor    Saida Felder PA-C  [id number] Notified by RN to evaluate pt for fever. Pt seen and evaluated at bedside. Pt denies any pain, urinary sx, abdominal pain, cough, SOB, chest pain, chills, or headache.     Vital Signs Last 24 Hrs  T(C): 38.6 (03-30-20 @ 21:46), Max: 39 (03-30-20 @ 20:20)  T(F): 101.5 (03-30-20 @ 21:46), Max: 102.2 (03-30-20 @ 20:20)  HR: 80 (03-30-20 @ 20:20) (78 - 82)  BP: 139/75 (03-30-20 @ 20:20) (135/65 - 140/87)  RR: 18 (03-30-20 @ 20:20) (18 - 18)  SpO2: 95% (03-30-20 @ 20:20) (93% - 97%)    Radiology:    PHYSICAL EXAM:  GENERAL: NAD, well-developed  ABDOMEN: Soft, Nontender, Nondistended  PSYCH: AAOx3    Assessment/Plan: HPI:  79F c hx HTN, right hip replacement, pw 1 month of dry cough, and fall c/b left hip pain and inability to ambulate. Found to have acetabulum and pubic ramus fx. Also c/o dry cough and fever s/p negative RVP, negative COVID, negative UA. Had + blood culture x1 on 3/27 but repeat bcx is pending d/t possible contamination. Now pt c/o fever 101.5 (rectal)     # fever   - UA reordered to r/o UTI - negative  - waiting for bcx result   - Tylenol 650mg ordered   - Gentle fluid ordered 100ml/hr x 8 hr  - Will continue to monitor    Saida Felder PA-C  [id number]

## 2020-03-30 NOTE — DISCHARGE NOTE PROVIDER - NSDCFUADDINST_GEN_ALL_CORE_FT
activity as tolerated; fall precaution; physical therapyj activity as tolerated; fall precaution; physical therapy activity as tolerated; fall precaution; physical therapy; Ceftin for 5 days (initiated on 3/31/20)

## 2020-03-30 NOTE — DISCHARGE NOTE PROVIDER - NSDCCPCAREPLAN_GEN_ALL_CORE_FT
PRINCIPAL DISCHARGE DIAGNOSIS  Diagnosis: Fall at home, initial encounter  Assessment and Plan of Treatment: . Acute, nondisplaced fracture of the anterior left acetabulum.   Acute, nondisplaced of the left inferior pubic ramus.  seen by ortho; weight bearing as tolerated; no acute surgical intervention now  physical therapy  ortho follow up as outpatient  fall precaution        SECONDARY DISCHARGE DIAGNOSES  Diagnosis: Inability to ambulate due to hip  Assessment and Plan of Treatment: . Acute, nondisplaced fracture of the anterior left acetabulum.   Acute, nondisplaced of the left inferior pubic ramus.  seen by ortho; weght bearing as toelrated; no surgical intervention now  follow up as outpatient      Diagnosis: Essential hypertension  Assessment and Plan of Treatment: Essential hypertension  continue blood pressure medication

## 2020-03-30 NOTE — PROVIDER CONTACT NOTE (OTHER) - ACTION/TREATMENT ORDERED:
Tylenol 650mg PO time one does ordered and given   IVF NS at 100ml/hr for 8hours ordered and given  UA sent to lab.  continue to monitor

## 2020-03-30 NOTE — DISCHARGE NOTE PROVIDER - NSDCMRMEDTOKEN_GEN_ALL_CORE_FT
acetaminophen 325 mg oral tablet: 2 tab(s) orally every 8 hours, As needed, Mild Pain (1 - 3)  atenolol 25 mg oral tablet: 1 tab(s) orally once a day  enoxaparin: 40 milligram(s) subcutaneous once a day  loratadine 10 mg oral tablet: 1 tab(s) orally once a day (at bedtime)  nystatin 100,000 units/g topical powder: 1 application topically 2 times a day  oxyCODONE 5 mg oral tablet: 1 tab(s) orally every 6 hours, As needed, Moderate Pain (4 - 6)  polyethylene glycol 3350 oral powder for reconstitution: 17 gram(s) orally once a day  senna oral tablet: 2 tab(s) orally once a day (at bedtime) acetaminophen 325 mg oral tablet: 2 tab(s) orally every 8 hours, As needed, Mild Pain (1 - 3)  atenolol 25 mg oral tablet: 1 tab(s) orally once a day  cefuroxime 500 mg oral tablet: 1 tab(s) orally every 12 hours  enoxaparin: 40 milligram(s) subcutaneous once a day  loratadine 10 mg oral tablet: 1 tab(s) orally once a day (at bedtime)  nystatin 100,000 units/g topical powder: 1 application topically 2 times a day  oxyCODONE 5 mg oral tablet: 1 tab(s) orally every 6 hours, As needed, Moderate Pain (4 - 6)  polyethylene glycol 3350 oral powder for reconstitution: 17 gram(s) orally once a day  senna oral tablet: 2 tab(s) orally once a day (at bedtime)

## 2020-03-30 NOTE — PROGRESS NOTE ADULT - SUBJECTIVE AND OBJECTIVE BOX
malik/  eager  to  leave    REVIEW OF SYSTEMS:  GEN: no fever,    no chills  RESP: no SOB,   no cough  CVS: no chest pain,   no palpitations  GI: no abdominal pain,   no nausea,   no vomiting,   no constipation,   no diarrhea  : no dysuria,   no frequency  NEURO: no headache,   no dizziness  PSYCH: no depression,   not anxious  Derm : no rash    MEDICATIONS  (STANDING):  ATENolol  Tablet 25 milliGRAM(s) Oral daily  enoxaparin Injectable 40 milliGRAM(s) SubCutaneous daily  loratadine 10 milliGRAM(s) Oral at bedtime  nystatin Powder 1 Application(s) Topical two times a day  polyethylene glycol 3350 17 Gram(s) Oral daily  senna 2 Tablet(s) Oral at bedtime    MEDICATIONS  (PRN):  acetaminophen   Tablet .. 650 milliGRAM(s) Oral every 8 hours PRN Mild Pain (1 - 3)  oxyCODONE    IR 5 milliGRAM(s) Oral every 6 hours PRN Moderate Pain (4 - 6)      Vital Signs Last 24 Hrs  T(C): 37.4 (30 Mar 2020 06:10), Max: 37.7 (29 Mar 2020 17:06)  T(F): 99.4 (30 Mar 2020 06:10), Max: 99.8 (29 Mar 2020 17:06)  HR: 82 (30 Mar 2020 06:10) (71 - 82)  BP: 137/78 (30 Mar 2020 06:10) (134/81 - 144/70)  BP(mean): --  RR: 18 (30 Mar 2020 06:10) (18 - 18)  SpO2: 95% (30 Mar 2020 06:10) (95% - 100%)  CAPILLARY BLOOD GLUCOSE        I&O's Summary    29 Mar 2020 07:01  -  30 Mar 2020 07:00  --------------------------------------------------------  IN: 1020 mL / OUT: 1050 mL / NET: -30 mL        PHYSICAL EXAM:  HEAD:  Atraumatic, Normocephalic  NECK: Supple, No   JVD  CHEST/LUNG:   no     rales,     no,    rhonchi  HEART: Regular rate and rhythm;         murmur  ABDOMEN: Soft, Nontender, ;   EXTREMITIES:    no    edema  NEUROLOGY:  alert    LABS:                        11.2   5.20  )-----------( 148      ( 28 Mar 2020 11:01 )             35.1         TPro  6.4  /  Alb  3.3  /  TBili  0.7  /  DBili  0.1  /  AST  47<H>  /  ALT  51<H>  /  AlkPhos  40  03-29                            Consultant(s) Notes Reviewed:      Care Discussed with Consultants/Other Providers:

## 2020-03-30 NOTE — DISCHARGE NOTE NURSING/CASE MANAGEMENT/SOCIAL WORK - PATIENT PORTAL LINK FT
You can access the FollowMyHealth Patient Portal offered by [facility] by registering at the following website: [email or web address]. By joining [facility]’s FollowMyHealth portal, you will also be able to view your health information using other applications (apps) compatible with our system.

## 2020-03-30 NOTE — CHART NOTE - NSCHARTNOTEFT_GEN_A_CORE
follow up- pt is cleared by attending MD for discharge to rehab; discussed discharge medication and follow up.  Verna Ruiz(NP)  [facility], [phone]

## 2020-03-30 NOTE — DISCHARGE NOTE PROVIDER - PROVIDER TOKENS
PROVIDER:[TOKEN:[[id number]]] PROVIDER:[TOKEN:[[id number]],FOLLOWUP:[2 weeks]],PROVIDER:[TOKEN:[[id number]]]

## 2020-03-30 NOTE — DISCHARGE NOTE PROVIDER - CARE PROVIDERS DIRECT ADDRESSES
,[email or web address]

## 2020-03-31 LAB
APPEARANCE UR: ABNORMAL
BACTERIA # UR AUTO: NEGATIVE — SIGNIFICANT CHANGE UP
BILIRUB UR-MCNC: NEGATIVE — SIGNIFICANT CHANGE UP
COLOR SPEC: YELLOW — SIGNIFICANT CHANGE UP
CULTURE RESULTS: SIGNIFICANT CHANGE UP
DIFF PNL FLD: NEGATIVE — SIGNIFICANT CHANGE UP
EPI CELLS # UR: 1 /HPF — SIGNIFICANT CHANGE UP
GLUCOSE UR QL: NEGATIVE — SIGNIFICANT CHANGE UP
KETONES UR-MCNC: NEGATIVE — SIGNIFICANT CHANGE UP
LEUKOCYTE ESTERASE UR-ACNC: NEGATIVE — SIGNIFICANT CHANGE UP
NITRITE UR-MCNC: NEGATIVE — SIGNIFICANT CHANGE UP
PH UR: 6 — SIGNIFICANT CHANGE UP (ref 5–8)
PROT UR-MCNC: ABNORMAL
RBC CASTS # UR COMP ASSIST: 3 /HPF — SIGNIFICANT CHANGE UP (ref 0–4)
SARS-COV-2 RNA SPEC QL NAA+PROBE: SIGNIFICANT CHANGE UP
SP GR SPEC: 1.03 — HIGH (ref 1.01–1.02)
SPECIMEN SOURCE: SIGNIFICANT CHANGE UP
UROBILINOGEN FLD QL: ABNORMAL
WBC UR QL: 0 /HPF — SIGNIFICANT CHANGE UP (ref 0–5)

## 2020-03-31 PROCEDURE — 99232 SBSQ HOSP IP/OBS MODERATE 35: CPT

## 2020-03-31 PROCEDURE — 71045 X-RAY EXAM CHEST 1 VIEW: CPT | Mod: 26

## 2020-03-31 PROCEDURE — 71250 CT THORAX DX C-: CPT | Mod: 26

## 2020-03-31 RX ORDER — CEFUROXIME AXETIL 250 MG
500 TABLET ORAL EVERY 12 HOURS
Refills: 0 | Status: DISCONTINUED | OUTPATIENT
Start: 2020-03-31 | End: 2020-04-02

## 2020-03-31 RX ADMIN — Medication 500 MILLIGRAM(S): at 17:02

## 2020-03-31 RX ADMIN — ENOXAPARIN SODIUM 40 MILLIGRAM(S): 100 INJECTION SUBCUTANEOUS at 11:09

## 2020-03-31 RX ADMIN — POLYETHYLENE GLYCOL 3350 17 GRAM(S): 17 POWDER, FOR SOLUTION ORAL at 11:09

## 2020-03-31 RX ADMIN — ATENOLOL 25 MILLIGRAM(S): 25 TABLET ORAL at 09:41

## 2020-03-31 RX ADMIN — NYSTATIN CREAM 1 APPLICATION(S): 100000 CREAM TOPICAL at 05:57

## 2020-03-31 RX ADMIN — NYSTATIN CREAM 1 APPLICATION(S): 100000 CREAM TOPICAL at 17:04

## 2020-03-31 NOTE — PROGRESS NOTE ADULT - SUBJECTIVE AND OBJECTIVE BOX
febrile, last night/  not  sob    REVIEW OF SYSTEMS:  GEN: no fever,    no chills  RESP: no SOB,   no cough  CVS: no chest pain,   no palpitations  GI: no abdominal pain,   no nausea,   no vomiting,   no constipation,   no diarrhea  : no dysuria,   no frequency  NEURO: no headache,   no dizziness  PSYCH: no depression,   not anxious  Derm : no rash    MEDICATIONS  (STANDING):  ATENolol  Tablet 25 milliGRAM(s) Oral daily  enoxaparin Injectable 40 milliGRAM(s) SubCutaneous daily  loratadine 10 milliGRAM(s) Oral at bedtime  nystatin Powder 1 Application(s) Topical two times a day  polyethylene glycol 3350 17 Gram(s) Oral daily  senna 2 Tablet(s) Oral at bedtime  sodium chloride 0.9%. 1000 milliLiter(s) (100 mL/Hr) IV Continuous <Continuous>    MEDICATIONS  (PRN):  acetaminophen   Tablet .. 650 milliGRAM(s) Oral every 8 hours PRN Mild Pain (1 - 3)  oxyCODONE    IR 5 milliGRAM(s) Oral every 6 hours PRN Moderate Pain (4 - 6)      Vital Signs Last 24 Hrs  T(C): 36.6 (31 Mar 2020 09:44), Max: 39 (30 Mar 2020 20:20)  T(F): 97.8 (31 Mar 2020 09:44), Max: 102.2 (30 Mar 2020 20:20)  HR: 80 (31 Mar 2020 09:44) (66 - 82)  BP: 136/71 (31 Mar 2020 09:44) (126/58 - 140/87)  BP(mean): --  RR: 18 (31 Mar 2020 05:52) (18 - 18)  SpO2: 99% (31 Mar 2020 09:44) (93% - 99%)  CAPILLARY BLOOD GLUCOSE        I&O's Summary    30 Mar 2020 07:01  -  31 Mar 2020 07:00  --------------------------------------------------------  IN: 1470 mL / OUT: 700 mL / NET: 770 mL        PHYSICAL EXAM:  HEAD:  Atraumatic, Normocephalic  NECK: Supple, No   JVD  CHEST/LUNG:   no     rales,     no,    rhonchi  HEART: Regular rate and rhythm;         murmur  ABDOMEN: Soft, Nontender, ;   EXTREMITIES:    no    edema  NEUROLOGY:  alert    LABS:                Urinalysis Basic - ( 30 Mar 2020 22:38 )    Color: Yellow / Appearance: Slightly Turbid / S.031 / pH: x  Gluc: x / Ketone: Negative  / Bili: Negative / Urobili: 2 mg/dL   Blood: x / Protein: 30 mg/dL / Nitrite: Negative   Leuk Esterase: Negative / RBC: 3 /hpf / WBC 0 /HPF   Sq Epi: x / Non Sq Epi: 1 /hpf / Bacteria: Negative                      Consultant(s) Notes Reviewed:      Care Discussed with Consultants/Other Providers:

## 2020-03-31 NOTE — PROGRESS NOTE ADULT - SUBJECTIVE AND OBJECTIVE BOX
CARDIOLOGY     PROGRESS  NOTE   ________________________________________________    CHIEF COMPLAINT:Patient is a 79y old  Female who presents with a chief complaint of left hip pain, fall (30 Mar 2020 14:33)  doing better.  	  REVIEW OF SYSTEMS:  CONSTITUTIONAL: No fever, weight loss, or fatigue  EYES: No eye pain, visual disturbances, or discharge  ENT:  No difficulty hearing, tinnitus, vertigo; No sinus or throat pain  NECK: No pain or stiffness  RESPIRATORY: No cough, wheezing, chills or hemoptysis; No Shortness of Breath  CARDIOVASCULAR: No chest pain, palpitations, passing out, dizziness, or leg swelling  GASTROINTESTINAL: No abdominal or epigastric pain. No nausea, vomiting, or hematemesis; No diarrhea or constipation. No melena or hematochezia.  GENITOURINARY: No dysuria, frequency, hematuria, or incontinence  NEUROLOGICAL: No headaches, memory loss, loss of strength, numbness, or tremors  SKIN: No itching, burning, rashes, or lesions   LYMPH Nodes: No enlarged glands  ENDOCRINE: No heat or cold intolerance; No hair loss  MUSCULOSKELETAL: No joint pain or swelling; No muscle, back, or extremity pain  PSYCHIATRIC: No depression, anxiety, mood swings, or difficulty sleeping  HEME/LYMPH: No easy bruising, or bleeding gums  ALLERGY AND IMMUNOLOGIC: No hives or eczema	    [ ] All others negative	  [ ] Unable to obtain    PHYSICAL EXAM:  T(C): 36.6 (03-31-20 @ 09:44), Max: 39 (03-30-20 @ 20:20)  HR: 80 (03-31-20 @ 09:44) (66 - 82)  BP: 136/71 (03-31-20 @ 09:44) (126/58 - 140/87)  RR: 18 (03-31-20 @ 05:52) (18 - 18)  SpO2: 99% (03-31-20 @ 09:44) (93% - 99%)  Wt(kg): --  I&O's Summary    30 Mar 2020 07:01  -  31 Mar 2020 07:00  --------------------------------------------------------  IN: 1470 mL / OUT: 700 mL / NET: 770 mL        Appearance: Normal	  HEENT:   Normal oral mucosa, PERRL, EOMI	  Lymphatic: No lymphadenopathy  Cardiovascular: Normal S1 S2, No JVD, + murmurs, No edema  Respiratory: Lungs clear to auscultation	  Psychiatry: A & O x 3, Mood & affect appropriate  Gastrointestinal:  Soft, Non-tender, + BS	  Skin: No rashes, No ecchymoses, No cyanosis	  Neurologic: Non-focal  Extremities: Normal range of motion, No clubbing, cyanosis or edema  Vascular: Peripheral pulses palpable 2+ bilaterally    MEDICATIONS  (STANDING):  ATENolol  Tablet 25 milliGRAM(s) Oral daily  enoxaparin Injectable 40 milliGRAM(s) SubCutaneous daily  loratadine 10 milliGRAM(s) Oral at bedtime  nystatin Powder 1 Application(s) Topical two times a day  polyethylene glycol 3350 17 Gram(s) Oral daily  senna 2 Tablet(s) Oral at bedtime  sodium chloride 0.9%. 1000 milliLiter(s) (100 mL/Hr) IV Continuous <Continuous>      TELEMETRY: 	    ECG:  	  RADIOLOGY:  OTHER: 	  	  LABS:	 	    CARDIAC MARKERS:                  proBNP:   Lipid Profile:   HgA1c:   TSH:         Assessment and plan  ---------------------------  79F c hx HTN, right hip replacement, pw 1 month of dry cough, and fall c/b left hip pain and inability to ambulate.  Pt states she has had a dry cough for about 1 month, but was otherwise in her usual state of health until 3 days ago, when she was walking in her home and suddenly fell backwards. Pt isn't sure what caused the fall, but pt denies any pre-syncope and LOC at the time. Pt states she fell onto her left hip, and pt immediately called out to her , who helped pt to the bed. Pt states she was unable to get up on her own at that time and has not been able to ambulate since then. Pt denies head trauma. Pt states she did not know she had a fever until arriving in the Ed, where she noticed she was also having chills. Other ROS as below.  pt with known hx of htn ?cad, s/p fall , ?syncope.  pt with no hx of arrythmia but is been  taking few meds for bp control, including HCTZ.  no chest pain.  s/p fall syncope  dc all bp meds including HCTZ  chest ct, covid negative  dvt prophylaxis  ecg noted  asa daily  may dc from cardiac stand point

## 2020-03-31 NOTE — PROGRESS NOTE ADULT - ASSESSMENT
79F c hx HTN, right hip replacement, pw 1 month of dry cough, and fall c/b left hip pain and inability to ambulate.    Pt states she has had a dry cough for about 1 month, but was otherwise in her usual state of health until 3 days ago, when she was walking in her home and suddenly fell backwards. Pt isn't sure what caused the fall, but pt denies any pre-syncope and LOC at the time. Pt states she fell onto her left hip, and pt immediately called out to her , who helped pt to the bed. Pt states she was unable to get up on her own at that time and has not been able to ambulate since then. Pt denies head trauma. Pt states she did not know she had a fever until arriving in the Ed, where she noticed she was also having chills. Other ROS as below.    VS: Tm 102.9, P 106, /56, R 20, 95% RA  In the Ed, received ceftriaxone, 1.5L, tylenol (27 Mar 2020 02:44)    WBC nl. UA (-) LE/nit.   RVP (-), Covid PCR (-) x 2.  Cxr clear.  CT pelvis shows acute, nondisplaced fracture of the anterior left acetabulum and  left inferior pubic ramus.  Xray showing possible left tibial plateau fx, but ortho does not think it is.  No acute intervention at this time.  Pt received rocephin x 1 in ER.  Being observed off abx.  ID  called for evaluation of fever.        Fever:    - Pt p/w fall and found to have nondisplaced fracture L acetabulum and pubic ramus.  No orthopedic intervention.    - Pt has chronic dry cough/post nasal drip.  RVP and Covid (-) x 2.  Cxr clear, no evidence for pna.     - UA (-) UCx (-)  Blood cultures (-)    - LFTs mildly elevated on admission, now trending down.     - Recurrent fever on 3/30.  Repeat covid testing neg.  UA (-).  f/u blood cultures.  Cxr with bibasilar opacities.  Check CT chest to evaluate for developing pna.     - Repeat cbc with diff and CMP.      CoNS bacteremia:    - Single set isolate (+), likely contaminant.  Repeat bcx sent, testing.  No need for abx at this time.          Will follow,    Stefanie Jiang  [phone]

## 2020-03-31 NOTE — PROGRESS NOTE ADULT - ASSESSMENT
79F        hx HTN, right hip replacement,    admitted   with   dry cough,, , low  grade fever,   and left hip pain after fall   ct pelvis, Left acetabular fx/ Left inf pubic  ramus  fx   xray knee. ?  fx, left tibia    HTN, on atenolol/  stopped  diuretic  febrile,  RVP  and COVID  19.. negative, on  3/ 27/  cxr, clear   urine  cs/,   negative/  ID d r Jiang  mild  elevated lfts, resolving   ortho  eval/  no  intervention   pain meds/ laxatives  blod  c/s. 1/2  bottles  on 3/  27 with  coag neg  staph .  which is a contaminant   dvt ppx/ PT eval noted,   needs rehab   now   febrile/  cxr with infiltrates   COVID  19. pending / O2  sat is  99        < from: CT 3D Reconstruct w/ Workstation (03.27.20 @ 00:32) >  es are noted in the anterior right hip.  IMPRESSION:  1. Acute, nondisplaced fracture of the anterior left acetabulum.  2. Acute, nondisplaced of the left inferior pubic ramus.  < end of copied text >     < from: Xray Knee 3 Views, Left (03.26.20 @ 22:24) >  IMPRESSION:  There is a lucency at the anterolateral aspect of the tibia best seen on the frontal x-ray of the left knee. Evaluation is limited secondary to overlying material, however this may represent a fracture. Correlate with point tenderness.. CT of the knee can be obtained if clinically indicated  < end of copied text >

## 2020-04-01 LAB
ALBUMIN SERPL ELPH-MCNC: 3.5 G/DL — SIGNIFICANT CHANGE UP (ref 3.3–5)
ALP SERPL-CCNC: 42 U/L — SIGNIFICANT CHANGE UP (ref 40–120)
ALT FLD-CCNC: 28 U/L — SIGNIFICANT CHANGE UP (ref 10–45)
ANION GAP SERPL CALC-SCNC: 13 MMOL/L — SIGNIFICANT CHANGE UP (ref 5–17)
AST SERPL-CCNC: 18 U/L — SIGNIFICANT CHANGE UP (ref 10–40)
BASOPHILS # BLD AUTO: 0.02 K/UL — SIGNIFICANT CHANGE UP (ref 0–0.2)
BASOPHILS NFR BLD AUTO: 0.4 % — SIGNIFICANT CHANGE UP (ref 0–2)
BILIRUB SERPL-MCNC: 1 MG/DL — SIGNIFICANT CHANGE UP (ref 0.2–1.2)
BUN SERPL-MCNC: 17 MG/DL — SIGNIFICANT CHANGE UP (ref 7–23)
CALCIUM SERPL-MCNC: 9.4 MG/DL — SIGNIFICANT CHANGE UP (ref 8.4–10.5)
CHLORIDE SERPL-SCNC: 106 MMOL/L — SIGNIFICANT CHANGE UP (ref 96–108)
CO2 SERPL-SCNC: 19 MMOL/L — LOW (ref 22–31)
CREAT SERPL-MCNC: 0.84 MG/DL — SIGNIFICANT CHANGE UP (ref 0.5–1.3)
EOSINOPHIL # BLD AUTO: 0.15 K/UL — SIGNIFICANT CHANGE UP (ref 0–0.5)
EOSINOPHIL NFR BLD AUTO: 2.6 % — SIGNIFICANT CHANGE UP (ref 0–6)
GLUCOSE SERPL-MCNC: 142 MG/DL — HIGH (ref 70–99)
HCT VFR BLD CALC: 30.7 % — LOW (ref 34.5–45)
HGB BLD-MCNC: 10.1 G/DL — LOW (ref 11.5–15.5)
IMM GRANULOCYTES NFR BLD AUTO: 1.2 % — SIGNIFICANT CHANGE UP (ref 0–1.5)
LYMPHOCYTES # BLD AUTO: 0.98 K/UL — LOW (ref 1–3.3)
LYMPHOCYTES # BLD AUTO: 17.3 % — SIGNIFICANT CHANGE UP (ref 13–44)
MCHC RBC-ENTMCNC: 30.8 PG — SIGNIFICANT CHANGE UP (ref 27–34)
MCHC RBC-ENTMCNC: 32.9 GM/DL — SIGNIFICANT CHANGE UP (ref 32–36)
MCV RBC AUTO: 93.6 FL — SIGNIFICANT CHANGE UP (ref 80–100)
MONOCYTES # BLD AUTO: 0.51 K/UL — SIGNIFICANT CHANGE UP (ref 0–0.9)
MONOCYTES NFR BLD AUTO: 9 % — SIGNIFICANT CHANGE UP (ref 2–14)
NEUTROPHILS # BLD AUTO: 3.95 K/UL — SIGNIFICANT CHANGE UP (ref 1.8–7.4)
NEUTROPHILS NFR BLD AUTO: 69.5 % — SIGNIFICANT CHANGE UP (ref 43–77)
NRBC # BLD: 0 /100 WBCS — SIGNIFICANT CHANGE UP (ref 0–0)
PLATELET # BLD AUTO: 181 K/UL — SIGNIFICANT CHANGE UP (ref 150–400)
POTASSIUM SERPL-MCNC: 4 MMOL/L — SIGNIFICANT CHANGE UP (ref 3.5–5.3)
POTASSIUM SERPL-SCNC: 4 MMOL/L — SIGNIFICANT CHANGE UP (ref 3.5–5.3)
PROT SERPL-MCNC: 6.3 G/DL — SIGNIFICANT CHANGE UP (ref 6–8.3)
RBC # BLD: 3.28 M/UL — LOW (ref 3.8–5.2)
RBC # FLD: 13 % — SIGNIFICANT CHANGE UP (ref 10.3–14.5)
SODIUM SERPL-SCNC: 138 MMOL/L — SIGNIFICANT CHANGE UP (ref 135–145)
WBC # BLD: 5.68 K/UL — SIGNIFICANT CHANGE UP (ref 3.8–10.5)
WBC # FLD AUTO: 5.68 K/UL — SIGNIFICANT CHANGE UP (ref 3.8–10.5)

## 2020-04-01 PROCEDURE — 99232 SBSQ HOSP IP/OBS MODERATE 35: CPT

## 2020-04-01 RX ORDER — ACETAMINOPHEN 500 MG
650 TABLET ORAL ONCE
Refills: 0 | Status: COMPLETED | OUTPATIENT
Start: 2020-04-01 | End: 2020-04-01

## 2020-04-01 RX ADMIN — NYSTATIN CREAM 1 APPLICATION(S): 100000 CREAM TOPICAL at 05:44

## 2020-04-01 RX ADMIN — Medication 500 MILLIGRAM(S): at 05:44

## 2020-04-01 RX ADMIN — NYSTATIN CREAM 1 APPLICATION(S): 100000 CREAM TOPICAL at 18:32

## 2020-04-01 RX ADMIN — ATENOLOL 25 MILLIGRAM(S): 25 TABLET ORAL at 10:32

## 2020-04-01 RX ADMIN — Medication 500 MILLIGRAM(S): at 18:31

## 2020-04-01 RX ADMIN — ENOXAPARIN SODIUM 40 MILLIGRAM(S): 100 INJECTION SUBCUTANEOUS at 11:33

## 2020-04-01 RX ADMIN — Medication 650 MILLIGRAM(S): at 05:48

## 2020-04-01 NOTE — DIETITIAN INITIAL EVALUATION ADULT. - PHYSICAL APPEARANCE
No visual signs of muscle/fat loss noted/overweight/other (specify) Ht: 61 inches Wt: 146.6 pounds BMI: 27.6 kg/m2 IBW: 105 (+/-10%) 139.6 %IBW  Noted +1 ra. foot edema as per flow sheets.   Skin: no noted pressure injuries as per documentation.

## 2020-04-01 NOTE — DIETITIAN INITIAL EVALUATION ADULT. - DIET TYPE
Recommend change to soft + DASH/TLC diet. Will continue to monitor and adjust as needed. Spoke to NP.

## 2020-04-01 NOTE — DIETITIAN INITIAL EVALUATION ADULT. - OTHER INFO
Pt reports good appetite and PO intake at home; reports not following any type of diet or restriction. Confirms NKFA; however states avoiding acidic foods such as pineapple and strawberries for diverticulosis. Reports taking Vitamin D, Vitamin B6, and Vitamin B12 PTA; denies drinking any nutritional supplement.     Pt reports good appetite and PO intake, states enjoying the food in-house. Noted 80 - 100% PO intake as per flow sheets. Reports difficulty chewing due to leaving dentures at home - requests soft diet, spoke to NP; Pt denies difficulty swallowing. Denies nausea, vomiting, diarrhea, and constipation, last BM yesterday (03/31).     Pt denies weight  changes PTA, reports UBW approximately 147 pounds. Weight as per flow sheets (03/27) 146.6 pounds - will continue to monitor.     Pt denies having questions/concerns about diet and nutrition at this time, refused education - made aware RD remains available.

## 2020-04-01 NOTE — DIETITIAN INITIAL EVALUATION ADULT. - ENERGY NEEDS
Pertinent information as per chart: Pt 80 y/o F with PMH: HTN, hip replacement, admitted with left hip pain, cough, and fever - covid19 negative.

## 2020-04-01 NOTE — PROGRESS NOTE ADULT - SUBJECTIVE AND OBJECTIVE BOX
febrile/    REVIEW OF SYSTEMS:  GEN: no fever,    no chills  RESP: no SOB,   no cough  CVS: no chest pain,   no palpitations  GI: no abdominal pain,   no nausea,   no vomiting,   no constipation,   no diarrhea  : no dysuria,   no frequency  NEURO: no headache,   no dizziness  PSYCH: no depression,   not anxious  Derm : no rash    MEDICATIONS  (STANDING):  ATENolol  Tablet 25 milliGRAM(s) Oral daily  cefuroxime   Tablet 500 milliGRAM(s) Oral every 12 hours  enoxaparin Injectable 40 milliGRAM(s) SubCutaneous daily  loratadine 10 milliGRAM(s) Oral at bedtime  nystatin Powder 1 Application(s) Topical two times a day  polyethylene glycol 3350 17 Gram(s) Oral daily  senna 2 Tablet(s) Oral at bedtime    MEDICATIONS  (PRN):  acetaminophen   Tablet .. 650 milliGRAM(s) Oral every 8 hours PRN Mild Pain (1 - 3)  oxyCODONE    IR 5 milliGRAM(s) Oral every 6 hours PRN Moderate Pain (4 - 6)      Vital Signs Last 24 Hrs  T(C): 37.2 (2020 06:42), Max: 38.1 (2020 04:59)  T(F): 99 (2020 06:42), Max: 100.5 (2020 04:59)  HR: 78 (2020 04:59) (77 - 78)  BP: 133/78 (2020 04:59) (133/78 - 137/82)  BP(mean): --  RR: 17 (2020 04:59) (17 - 18)  SpO2: 96% (2020 04:59) (96% - 99%)  CAPILLARY BLOOD GLUCOSE        I&O's Summary    2020 07:01  -  2020 11:14  --------------------------------------------------------  IN: 240 mL / OUT: 0 mL / NET: 240 mL        PHYSICAL EXAM:  HEAD:  Atraumatic, Normocephalic  NECK: Supple, No   JVD  CHEST/LUNG:   no     rales,     no,    rhonchi  HEART: Regular rate and rhythm;         murmur  ABDOMEN: Soft, Nontender, ;   EXTREMITIES:   no     edema  NEUROLOGY:  alert    LABS:                        10.1   5.68  )-----------( 181      ( 2020 07:02 )             30.7     04-    138  |  106  |  17  ----------------------------<  142<H>  4.0   |  19<L>  |  0.84    Ca    9.4      2020 06:57    TPro  6.3  /  Alb  3.5  /  TBili  1.0  /  DBili  x   /  AST  18  /  ALT  28  /  AlkPhos  42  04-          Urinalysis Basic - ( 30 Mar 2020 22:38 )    Color: Yellow / Appearance: Slightly Turbid / S.031 / pH: x  Gluc: x / Ketone: Negative  / Bili: Negative / Urobili: 2 mg/dL   Blood: x / Protein: 30 mg/dL / Nitrite: Negative   Leuk Esterase: Negative / RBC: 3 /hpf / WBC 0 /HPF   Sq Epi: x / Non Sq Epi: 1 /hpf / Bacteria: Negative                      Consultant(s) Notes Reviewed:      Care Discussed with Consultants/Other Providers:

## 2020-04-01 NOTE — DIETITIAN INITIAL EVALUATION ADULT. - ADD RECOMMEND
1. Will continue to monitor PO intake, weight, labs, skin, GI status, diet. 2. Encourage PO intake and obtain food preferences (obtained at this time). 3. Pt made aware RD remains available.

## 2020-04-01 NOTE — DIETITIAN INITIAL EVALUATION ADULT. - PROBLEM SELECTOR PLAN 1
- xray showing possible left tibial plateau fx, but ortho does not think it is  - CT pelvis showing left acetabular nondisplaced fx and left inf rami fx  - pt requests tylenol with codeine  - per ortho, call them again once pt is ruled out for covid 19. ortho states there is no acute intervention at this time  - toe touch weight bearing on left

## 2020-04-01 NOTE — PROGRESS NOTE ADULT - ASSESSMENT
79F        hx HTN, right hip replacement,    admitted   with   dry cough,, , low  grade fever,   and left hip pain after fall   ct pelvis, Left acetabular fx/ Left inf pubic  ramus  fx   xray knee. ?  fx, left tibia    HTN, on atenolol/  stopped  diuretic  febrile,  RVP  and COVID  19.. negative, on  3/ 27/  cxr, clear   urine  cs/,   negative/  ID d r Jiang  mild  elevated lfts, resolving   ortho  eval/  no  intervention   pain meds/ laxatives  blod  c/s. 1/2  bottles  on   3/  27 with  coag neg  staph .  which is a contaminant   dvt ppx/ PT eval noted,   needs rehab   now   febrile/  cxr with infiltrates   COVID  19.  from  3/  31, is negative/  / O2  sat is  98  ct chest  3/31, no pna  d/c  when cleared  by  ID/  pt  appears well/  eager to   leave     from: CT Chest No Cont (03.31.20 @ 16:35) >  IMPRESSION:  Clear lungs. No consolidations, edema, effusion or pneumothorax.  Incidental findings as above  < end of copied text >     < from: CT 3D Reconstruct w/ Workstation (03.27.20 @ 00:32) >  es are noted in the anterior right hip.  IMPRESSION:  1. Acute, nondisplaced fracture of the anterior left acetabulum.  2. Acute, nondisplaced of the left inferior pubic ramus.  < end of copied text >     < from: Xray Knee 3 Views, Left (03.26.20 @ 22:24) >  IMPRESSION:  There is a lucency at the anterolateral aspect of the tibia best seen on the frontal x-ray of the left knee. Evaluation is limited secondary to overlying material, however this may represent a fracture. Correlate with point tenderness.. CT of the knee can be obtained if clinically indicated  < end of copied text >

## 2020-04-01 NOTE — PROGRESS NOTE ADULT - SUBJECTIVE AND OBJECTIVE BOX
CARDIOLOGY     PROGRESS  NOTE   ________________________________________________    CHIEF COMPLAINT:Patient is a 79y old  Female who presents with a chief complaint of left hip pain, fall (01 Apr 2020 11:13)  no complain.  	  REVIEW OF SYSTEMS:  CONSTITUTIONAL: No fever, weight loss, or fatigue  EYES: No eye pain, visual disturbances, or discharge  ENT:  No difficulty hearing, tinnitus, vertigo; No sinus or throat pain  NECK: No pain or stiffness  RESPIRATORY: No cough, wheezing, chills or hemoptysis; No Shortness of Breath  CARDIOVASCULAR: No chest pain, palpitations, passing out, dizziness, or leg swelling  GASTROINTESTINAL: No abdominal or epigastric pain. No nausea, vomiting, or hematemesis; No diarrhea or constipation. No melena or hematochezia.  GENITOURINARY: No dysuria, frequency, hematuria, or incontinence  NEUROLOGICAL: No headaches, memory loss, loss of strength, numbness, or tremors  SKIN: No itching, burning, rashes, or lesions   LYMPH Nodes: No enlarged glands  ENDOCRINE: No heat or cold intolerance; No hair loss  MUSCULOSKELETAL: No joint pain or swelling; No muscle, back, or extremity pain  PSYCHIATRIC: No depression, anxiety, mood swings, or difficulty sleeping  HEME/LYMPH: No easy bruising, or bleeding gums  ALLERGY AND IMMUNOLOGIC: No hives or eczema	    [ ] All others negative	  [ ] Unable to obtain    PHYSICAL EXAM:  T(C): 37.2 (04-01-20 @ 06:42), Max: 38.1 (04-01-20 @ 04:59)  HR: 78 (04-01-20 @ 04:59) (77 - 78)  BP: 133/78 (04-01-20 @ 04:59) (133/78 - 137/82)  RR: 17 (04-01-20 @ 04:59) (17 - 18)  SpO2: 96% (04-01-20 @ 04:59) (96% - 99%)  Wt(kg): --  I&O's Summary    01 Apr 2020 07:01  -  01 Apr 2020 11:43  --------------------------------------------------------  IN: 240 mL / OUT: 0 mL / NET: 240 mL        Appearance: Normal	  HEENT:   Normal oral mucosa, PERRL, EOMI	  Lymphatic: No lymphadenopathy  Cardiovascular: Normal S1 S2, No JVD, + murmurs, No edema  Respiratory: Lungs clear to auscultation	  Psychiatry: A & O x 3, Mood & affect appropriate  Gastrointestinal:  Soft, Non-tender, + BS	  Skin: No rashes, No ecchymoses, No cyanosis	  Neurologic: Non-focal  Extremities: Normal range of motion, No clubbing, cyanosis or edema  Vascular: Peripheral pulses palpable 2+ bilaterally    MEDICATIONS  (STANDING):  ATENolol  Tablet 25 milliGRAM(s) Oral daily  cefuroxime   Tablet 500 milliGRAM(s) Oral every 12 hours  enoxaparin Injectable 40 milliGRAM(s) SubCutaneous daily  loratadine 10 milliGRAM(s) Oral at bedtime  nystatin Powder 1 Application(s) Topical two times a day  polyethylene glycol 3350 17 Gram(s) Oral daily  senna 2 Tablet(s) Oral at bedtime      TELEMETRY: 	    ECG:  	  RADIOLOGY:  OTHER: 	  	  LABS:	 	    CARDIAC MARKERS:                                10.1   5.68  )-----------( 181      ( 01 Apr 2020 07:02 )             30.7     04-01    138  |  106  |  17  ----------------------------<  142<H>  4.0   |  19<L>  |  0.84    Ca    9.4      01 Apr 2020 06:57    TPro  6.3  /  Alb  3.5  /  TBili  1.0  /  DBili  x   /  AST  18  /  ALT  28  /  AlkPhos  42  04-01    proBNP:   Lipid Profile:   HgA1c:   TSH:         Assessment and plan  ---------------------------  79F c hx HTN, right hip replacement, pw 1 month of dry cough, and fall c/b left hip pain and inability to ambulate.  Pt states she has had a dry cough for about 1 month, but was otherwise in her usual state of health until 3 days ago, when she was walking in her home and suddenly fell backwards. Pt isn't sure what caused the fall, but pt denies any pre-syncope and LOC at the time. Pt states she fell onto her left hip, and pt immediately called out to her , who helped pt to the bed. Pt states she was unable to get up on her own at that time and has not been able to ambulate since then. Pt denies head trauma. Pt states she did not know she had a fever until arriving in the Ed, where she noticed she was also having chills. Other ROS as below.  pt with known hx of htn ?cad, s/p fall , ?syncope.  pt with no hx of arrythmia but is been  taking few meds for bp control, including HCTZ.  no chest pain.  s/p fall syncope  dc all bp meds including HCTZ  chest ct, covid negative  dvt prophylaxis  ecg noted  asa daily  may dc from cardiac stand point

## 2020-04-01 NOTE — PROVIDER CONTACT NOTE (OTHER) - BACKGROUND
Pt admitted s/p fall and cough then spiked fever during admission  	 x ray chest-Question of ill-defined opacities in the lower lungs, covid neg

## 2020-04-02 VITALS
OXYGEN SATURATION: 98 % | SYSTOLIC BLOOD PRESSURE: 134 MMHG | HEART RATE: 89 BPM | RESPIRATION RATE: 20 BRPM | DIASTOLIC BLOOD PRESSURE: 74 MMHG | TEMPERATURE: 100 F

## 2020-04-02 LAB
CULTURE RESULTS: SIGNIFICANT CHANGE UP
CULTURE RESULTS: SIGNIFICANT CHANGE UP
SPECIMEN SOURCE: SIGNIFICANT CHANGE UP
SPECIMEN SOURCE: SIGNIFICANT CHANGE UP

## 2020-04-02 PROCEDURE — 71250 CT THORAX DX C-: CPT

## 2020-04-02 PROCEDURE — 86850 RBC ANTIBODY SCREEN: CPT

## 2020-04-02 PROCEDURE — 80053 COMPREHEN METABOLIC PANEL: CPT

## 2020-04-02 PROCEDURE — 99238 HOSP IP/OBS DSCHRG MGMT 30/<: CPT

## 2020-04-02 PROCEDURE — 93005 ELECTROCARDIOGRAM TRACING: CPT

## 2020-04-02 PROCEDURE — 97161 PT EVAL LOW COMPLEX 20 MIN: CPT

## 2020-04-02 PROCEDURE — 96374 THER/PROPH/DIAG INJ IV PUSH: CPT

## 2020-04-02 PROCEDURE — 87798 DETECT AGENT NOS DNA AMP: CPT

## 2020-04-02 PROCEDURE — 82330 ASSAY OF CALCIUM: CPT

## 2020-04-02 PROCEDURE — 87635 SARS-COV-2 COVID-19 AMP PRB: CPT

## 2020-04-02 PROCEDURE — 76377 3D RENDER W/INTRP POSTPROCES: CPT

## 2020-04-02 PROCEDURE — 81001 URINALYSIS AUTO W/SCOPE: CPT

## 2020-04-02 PROCEDURE — 83605 ASSAY OF LACTIC ACID: CPT

## 2020-04-02 PROCEDURE — 72190 X-RAY EXAM OF PELVIS: CPT

## 2020-04-02 PROCEDURE — 97116 GAIT TRAINING THERAPY: CPT

## 2020-04-02 PROCEDURE — 83690 ASSAY OF LIPASE: CPT

## 2020-04-02 PROCEDURE — 73090 X-RAY EXAM OF FOREARM: CPT

## 2020-04-02 PROCEDURE — 99285 EMERGENCY DEPT VISIT HI MDM: CPT | Mod: 25

## 2020-04-02 PROCEDURE — 97530 THERAPEUTIC ACTIVITIES: CPT

## 2020-04-02 PROCEDURE — 73700 CT LOWER EXTREMITY W/O DYE: CPT

## 2020-04-02 PROCEDURE — 80076 HEPATIC FUNCTION PANEL: CPT

## 2020-04-02 PROCEDURE — 82947 ASSAY GLUCOSE BLOOD QUANT: CPT

## 2020-04-02 PROCEDURE — 87150 DNA/RNA AMPLIFIED PROBE: CPT

## 2020-04-02 PROCEDURE — 87086 URINE CULTURE/COLONY COUNT: CPT

## 2020-04-02 PROCEDURE — 73562 X-RAY EXAM OF KNEE 3: CPT

## 2020-04-02 PROCEDURE — 85027 COMPLETE CBC AUTOMATED: CPT

## 2020-04-02 PROCEDURE — 86900 BLOOD TYPING SEROLOGIC ABO: CPT

## 2020-04-02 PROCEDURE — 85730 THROMBOPLASTIN TIME PARTIAL: CPT

## 2020-04-02 PROCEDURE — 73030 X-RAY EXAM OF SHOULDER: CPT

## 2020-04-02 PROCEDURE — 73060 X-RAY EXAM OF HUMERUS: CPT

## 2020-04-02 PROCEDURE — 99232 SBSQ HOSP IP/OBS MODERATE 35: CPT

## 2020-04-02 PROCEDURE — 73552 X-RAY EXAM OF FEMUR 2/>: CPT

## 2020-04-02 PROCEDURE — 85610 PROTHROMBIN TIME: CPT

## 2020-04-02 PROCEDURE — 87486 CHLMYD PNEUM DNA AMP PROBE: CPT

## 2020-04-02 PROCEDURE — 87633 RESP VIRUS 12-25 TARGETS: CPT

## 2020-04-02 PROCEDURE — 84132 ASSAY OF SERUM POTASSIUM: CPT

## 2020-04-02 PROCEDURE — 71045 X-RAY EXAM CHEST 1 VIEW: CPT

## 2020-04-02 PROCEDURE — 85014 HEMATOCRIT: CPT

## 2020-04-02 PROCEDURE — 87581 M.PNEUMON DNA AMP PROBE: CPT

## 2020-04-02 PROCEDURE — 84100 ASSAY OF PHOSPHORUS: CPT

## 2020-04-02 PROCEDURE — 73502 X-RAY EXAM HIP UNI 2-3 VIEWS: CPT

## 2020-04-02 PROCEDURE — 83735 ASSAY OF MAGNESIUM: CPT

## 2020-04-02 PROCEDURE — 82435 ASSAY OF BLOOD CHLORIDE: CPT

## 2020-04-02 PROCEDURE — 84295 ASSAY OF SERUM SODIUM: CPT

## 2020-04-02 PROCEDURE — 72192 CT PELVIS W/O DYE: CPT

## 2020-04-02 PROCEDURE — 86901 BLOOD TYPING SEROLOGIC RH(D): CPT

## 2020-04-02 PROCEDURE — 87040 BLOOD CULTURE FOR BACTERIA: CPT

## 2020-04-02 PROCEDURE — 82803 BLOOD GASES ANY COMBINATION: CPT

## 2020-04-02 RX ORDER — CEFUROXIME AXETIL 250 MG
1 TABLET ORAL
Qty: 0 | Refills: 0 | DISCHARGE
Start: 2020-04-02

## 2020-04-02 RX ADMIN — ENOXAPARIN SODIUM 40 MILLIGRAM(S): 100 INJECTION SUBCUTANEOUS at 11:57

## 2020-04-02 RX ADMIN — ATENOLOL 25 MILLIGRAM(S): 25 TABLET ORAL at 11:57

## 2020-04-02 RX ADMIN — Medication 500 MILLIGRAM(S): at 05:21

## 2020-04-02 NOTE — PROGRESS NOTE ADULT - ASSESSMENT
79F        hx HTN, right hip replacement,    admitted   with   dry cough,, , low  grade fever,   and left hip pain after fall   ct pelvis, Left acetabular fx/ Left inf pubic  ramus  fx   xray knee. ?  fx, left tibia    HTN, on atenolol/  stopped  diuretic  febrile,  RVP  and COVID  19.. negative, on  3/ 27/  cxr, clear   urine  cs/,   negative/  ID d r Jiang  mild  elevated lfts, resolving   ortho  eval/  no  intervention   pain meds/ laxatives  blod  c/s. 1/2  bottles  on   3/  27 with  coag neg  staph .  which is a contaminant   dvt ppx/ PT eval noted,   needs rehab   now   febrile/  cxr with infiltrates   COVID  19.  from  3/  31, is negative/  / O2  sat is  98  ct chest  3/31, no pna   cleared  by  ID/  pt  appears well/    cleared  for   d/c  to  rehab/  pt  stable     from: CT Chest No Cont (03.31.20 @ 16:35) >  IMPRESSION:  Clear lungs. No consolidations, edema, effusion or pneumothorax.  Incidental findings as above  < end of copied text >     < from: CT 3D Reconstruct w/ Workstation (03.27.20 @ 00:32) >  es are noted in the anterior right hip.  IMPRESSION:  1. Acute, nondisplaced fracture of the anterior left acetabulum.  2. Acute, nondisplaced of the left inferior pubic ramus.  < end of copied text >     < from: Xray Knee 3 Views, Left (03.26.20 @ 22:24) >  IMPRESSION:  There is a lucency at the anterolateral aspect of the tibia best seen on the frontal x-ray of the left knee. Evaluation is limited secondary to overlying material, however this may represent a fracture. Correlate with point tenderness.. CT of the knee can be obtained if clinically indicated  < end of copied text >

## 2020-04-02 NOTE — PROGRESS NOTE ADULT - SUBJECTIVE AND OBJECTIVE BOX
CARDIOLOGY     PROGRESS  NOTE   ________________________________________________    CHIEF COMPLAINT:Patient is a 79y old  Female who presents with a chief complaint of left hip pain, fall (02 Apr 2020 09:03)  no complain.  	  REVIEW OF SYSTEMS:  CONSTITUTIONAL: No fever, weight loss, or fatigue  EYES: No eye pain, visual disturbances, or discharge  ENT:  No difficulty hearing, tinnitus, vertigo; No sinus or throat pain  NECK: No pain or stiffness  RESPIRATORY: No cough, wheezing, chills or hemoptysis; No Shortness of Breath  CARDIOVASCULAR: No chest pain, palpitations, passing out, dizziness, or leg swelling  GASTROINTESTINAL: No abdominal or epigastric pain. No nausea, vomiting, or hematemesis; No diarrhea or constipation. No melena or hematochezia.  GENITOURINARY: No dysuria, frequency, hematuria, or incontinence  NEUROLOGICAL: No headaches, memory loss, loss of strength, numbness, or tremors  SKIN: No itching, burning, rashes, or lesions   LYMPH Nodes: No enlarged glands  ENDOCRINE: No heat or cold intolerance; No hair loss  MUSCULOSKELETAL: No joint pain or swelling; No muscle, back, or extremity pain  PSYCHIATRIC: No depression, anxiety, mood swings, or difficulty sleeping  HEME/LYMPH: No easy bruising, or bleeding gums  ALLERGY AND IMMUNOLOGIC: No hives or eczema	    [ ] All others negative	  [ ] Unable to obtain    PHYSICAL EXAM:  T(C): 37.4 (04-02-20 @ 04:44), Max: 37.7 (04-01-20 @ 21:00)  HR: 78 (04-02-20 @ 04:44) (69 - 80)  BP: 127/71 (04-02-20 @ 04:44) (124/67 - 144/69)  RR: 18 (04-02-20 @ 04:44) (18 - 18)  SpO2: 96% (04-02-20 @ 04:44) (95% - 100%)  Wt(kg): --  I&O's Summary    01 Apr 2020 07:01  -  02 Apr 2020 07:00  --------------------------------------------------------  IN: 480 mL / OUT: 450 mL / NET: 30 mL        Appearance: Normal	  HEENT:   Normal oral mucosa, PERRL, EOMI	  Lymphatic: No lymphadenopathy  Cardiovascular: Normal S1 S2, No JVD, +murmurs, No edema  Respiratory: Lungs clear to auscultation	  Psychiatry: A & O x 3, Mood & affect appropriate  Gastrointestinal:  Soft, Non-tender, + BS	  Skin: No rashes, No ecchymoses, No cyanosis	  Neurologic: Non-focal  Extremities: Normal range of motion, No clubbing, cyanosis or edema  Vascular: Peripheral pulses palpable 2+ bilaterally    MEDICATIONS  (STANDING):  ATENolol  Tablet 25 milliGRAM(s) Oral daily  cefuroxime   Tablet 500 milliGRAM(s) Oral every 12 hours  enoxaparin Injectable 40 milliGRAM(s) SubCutaneous daily  loratadine 10 milliGRAM(s) Oral at bedtime  nystatin Powder 1 Application(s) Topical two times a day  polyethylene glycol 3350 17 Gram(s) Oral daily  senna 2 Tablet(s) Oral at bedtime      TELEMETRY: 	    ECG:  	  RADIOLOGY:  OTHER: 	  	  LABS:	 	    CARDIAC MARKERS:                                10.1   5.68  )-----------( 181      ( 01 Apr 2020 07:02 )             30.7     04-01    138  |  106  |  17  ----------------------------<  142<H>  4.0   |  19<L>  |  0.84    Ca    9.4      01 Apr 2020 06:57    TPro  6.3  /  Alb  3.5  /  TBili  1.0  /  DBili  x   /  AST  18  /  ALT  28  /  AlkPhos  42  04-01    proBNP:   Lipid Profile:   HgA1c:   TSH:     Notes: Chart reviewed.  Previous notes read and appreciated.  Patient  transferred from [facility].  Case conferenced on AM rounds with interdisciplinary  team.  Patient remains medically acute, febrile overnight, urine/blood cultures  collected and sent.  COVID-19 PCR negative.  Patient previously accepted to  [facility] and [facility].  Anticipate transfer to rehab pending bed  availability on day of discharge.   will continue to remain  available.    Assessment and plan  ---------------------------  79F c hx HTN, right hip replacement, pw 1 month of dry cough, and fall c/b left hip pain and inability to ambulate.  Pt states she has had a dry cough for about 1 month, but was otherwise in her usual state of health until 3 days ago, when she was walking in her home and suddenly fell backwards. Pt isn't sure what caused the fall, but pt denies any pre-syncope and LOC at the time. Pt states she fell onto her left hip, and pt immediately called out to her , who helped pt to the bed. Pt states she was unable to get up on her own at that time and has not been able to ambulate since then. Pt denies head trauma. Pt states she did not know she had a fever until arriving in the Ed, where she noticed she was also having chills. Other ROS as below.  pt with known hx of htn ?cad, s/p fall , ?syncope.  pt with no hx of arrythmia but is been  taking few meds for bp control, including HCTZ.  no chest pain.  s/p fall syncope  dc all bp meds including HCTZ  chest ct, covid negative  dvt prophylaxis  ecg noted  asa daily  may dc from cardiac stand point, if  cleared by ID

## 2020-04-02 NOTE — PROGRESS NOTE ADULT - PROVIDER SPECIALTY LIST ADULT
Cardiology
Infectious Disease
Internal Medicine
Infectious Disease

## 2020-04-02 NOTE — PROGRESS NOTE ADULT - REASON FOR ADMISSION
left hip pain, fall

## 2020-04-02 NOTE — PROGRESS NOTE ADULT - SUBJECTIVE AND OBJECTIVE BOX
afebrile/  no  complaints    REVIEW OF SYSTEMS:  GEN: no fever,    no chills  RESP: no SOB,   no cough  CVS: no chest pain,   no palpitations  GI: no abdominal pain,   no nausea,   no vomiting,   no constipation,   no diarrhea  : no dysuria,   no frequency  NEURO: no headache,   no dizziness  PSYCH: no depression,   not anxious  Derm : no rash    MEDICATIONS  (STANDING):  ATENolol  Tablet 25 milliGRAM(s) Oral daily  cefuroxime   Tablet 500 milliGRAM(s) Oral every 12 hours  enoxaparin Injectable 40 milliGRAM(s) SubCutaneous daily  loratadine 10 milliGRAM(s) Oral at bedtime  nystatin Powder 1 Application(s) Topical two times a day  polyethylene glycol 3350 17 Gram(s) Oral daily  senna 2 Tablet(s) Oral at bedtime    MEDICATIONS  (PRN):  acetaminophen   Tablet .. 650 milliGRAM(s) Oral every 8 hours PRN Mild Pain (1 - 3)  oxyCODONE    IR 5 milliGRAM(s) Oral every 6 hours PRN Moderate Pain (4 - 6)      Vital Signs Last 24 Hrs  T(C): 37.4 (02 Apr 2020 04:44), Max: 37.7 (01 Apr 2020 21:00)  T(F): 99.3 (02 Apr 2020 04:44), Max: 99.8 (01 Apr 2020 21:00)  HR: 78 (02 Apr 2020 04:44) (69 - 80)  BP: 127/71 (02 Apr 2020 04:44) (124/67 - 144/69)  BP(mean): --  RR: 18 (02 Apr 2020 04:44) (18 - 18)  SpO2: 96% (02 Apr 2020 04:44) (95% - 100%)  CAPILLARY BLOOD GLUCOSE        I&O's Summary    01 Apr 2020 07:01  -  02 Apr 2020 07:00  --------------------------------------------------------  IN: 480 mL / OUT: 450 mL / NET: 30 mL        PHYSICAL EXAM:  HEAD:  Atraumatic, Normocephalic  NECK: Supple, No   JVD  CHEST/LUNG:   no     rales,     no,    rhonchi  HEART: Regular rate and rhythm;         murmur  ABDOMEN: Soft, Nontender, ;   EXTREMITIES:    no    edema  NEUROLOGY:  alert    LABS:                        10.1   5.68  )-----------( 181      ( 01 Apr 2020 07:02 )             30.7     04-01    138  |  106  |  17  ----------------------------<  142<H>  4.0   |  19<L>  |  0.84    Ca    9.4      01 Apr 2020 06:57    TPro  6.3  /  Alb  3.5  /  TBili  1.0  /  DBili  x   /  AST  18  /  ALT  28  /  AlkPhos  42  04-01                            Consultant(s) Notes Reviewed:      Care Discussed with Consultants/Other Providers:

## 2020-05-03 ENCOUNTER — INPATIENT (INPATIENT)
Facility: HOSPITAL | Age: 80
LOS: 3 days | Discharge: NURSING, REHAB OR LONG-TERM CARE | DRG: 149 | End: 2020-05-07
Attending: INTERNAL MEDICINE | Admitting: INTERNAL MEDICINE
Payer: MEDICARE

## 2020-05-03 VITALS
SYSTOLIC BLOOD PRESSURE: 178 MMHG | DIASTOLIC BLOOD PRESSURE: 92 MMHG | HEART RATE: 67 BPM | RESPIRATION RATE: 18 BRPM | HEIGHT: 65 IN | OXYGEN SATURATION: 99 % | WEIGHT: 110.01 LBS | TEMPERATURE: 98 F

## 2020-05-03 DIAGNOSIS — Z90.710 ACQUIRED ABSENCE OF BOTH CERVIX AND UTERUS: Chronic | ICD-10-CM

## 2020-05-03 DIAGNOSIS — Z96.641 PRESENCE OF RIGHT ARTIFICIAL HIP JOINT: Chronic | ICD-10-CM

## 2020-05-03 DIAGNOSIS — R42 DIZZINESS AND GIDDINESS: ICD-10-CM

## 2020-05-03 PROBLEM — I10 ESSENTIAL (PRIMARY) HYPERTENSION: Chronic | Status: ACTIVE | Noted: 2020-03-27

## 2020-05-03 LAB
ALBUMIN SERPL ELPH-MCNC: 4.3 G/DL — SIGNIFICANT CHANGE UP (ref 3.3–5)
ALP SERPL-CCNC: 82 U/L — SIGNIFICANT CHANGE UP (ref 40–120)
ALT FLD-CCNC: 67 U/L — HIGH (ref 10–45)
ANION GAP SERPL CALC-SCNC: 16 MMOL/L — SIGNIFICANT CHANGE UP (ref 5–17)
APPEARANCE UR: CLEAR — SIGNIFICANT CHANGE UP
AST SERPL-CCNC: 66 U/L — HIGH (ref 10–40)
BASOPHILS # BLD AUTO: 0.04 K/UL — SIGNIFICANT CHANGE UP (ref 0–0.2)
BASOPHILS NFR BLD AUTO: 0.5 % — SIGNIFICANT CHANGE UP (ref 0–2)
BILIRUB SERPL-MCNC: 1.2 MG/DL — SIGNIFICANT CHANGE UP (ref 0.2–1.2)
BILIRUB UR-MCNC: NEGATIVE — SIGNIFICANT CHANGE UP
BUN SERPL-MCNC: 13 MG/DL — SIGNIFICANT CHANGE UP (ref 7–23)
CALCIUM SERPL-MCNC: 10.7 MG/DL — HIGH (ref 8.4–10.5)
CHLORIDE SERPL-SCNC: 95 MMOL/L — LOW (ref 96–108)
CO2 SERPL-SCNC: 21 MMOL/L — LOW (ref 22–31)
COLOR SPEC: SIGNIFICANT CHANGE UP
CREAT SERPL-MCNC: 0.73 MG/DL — SIGNIFICANT CHANGE UP (ref 0.5–1.3)
DIFF PNL FLD: NEGATIVE — SIGNIFICANT CHANGE UP
EOSINOPHIL # BLD AUTO: 0.17 K/UL — SIGNIFICANT CHANGE UP (ref 0–0.5)
EOSINOPHIL NFR BLD AUTO: 2.1 % — SIGNIFICANT CHANGE UP (ref 0–6)
EPI CELLS # UR: 1 — SIGNIFICANT CHANGE UP
GLUCOSE SERPL-MCNC: 120 MG/DL — HIGH (ref 70–99)
GLUCOSE UR QL: NEGATIVE — SIGNIFICANT CHANGE UP
HCT VFR BLD CALC: 39.3 % — SIGNIFICANT CHANGE UP (ref 34.5–45)
HGB BLD-MCNC: 13.1 G/DL — SIGNIFICANT CHANGE UP (ref 11.5–15.5)
IMM GRANULOCYTES NFR BLD AUTO: 1.4 % — SIGNIFICANT CHANGE UP (ref 0–1.5)
KETONES UR-MCNC: NEGATIVE — SIGNIFICANT CHANGE UP
LEUKOCYTE ESTERASE UR-ACNC: NEGATIVE — SIGNIFICANT CHANGE UP
LIDOCAIN IGE QN: 31 U/L — SIGNIFICANT CHANGE UP (ref 7–60)
LYMPHOCYTES # BLD AUTO: 1.36 K/UL — SIGNIFICANT CHANGE UP (ref 1–3.3)
LYMPHOCYTES # BLD AUTO: 17 % — SIGNIFICANT CHANGE UP (ref 13–44)
MCHC RBC-ENTMCNC: 30.4 PG — SIGNIFICANT CHANGE UP (ref 27–34)
MCHC RBC-ENTMCNC: 33.3 GM/DL — SIGNIFICANT CHANGE UP (ref 32–36)
MCV RBC AUTO: 91.2 FL — SIGNIFICANT CHANGE UP (ref 80–100)
MONOCYTES # BLD AUTO: 0.64 K/UL — SIGNIFICANT CHANGE UP (ref 0–0.9)
MONOCYTES NFR BLD AUTO: 8 % — SIGNIFICANT CHANGE UP (ref 2–14)
NEUTROPHILS # BLD AUTO: 5.7 K/UL — SIGNIFICANT CHANGE UP (ref 1.8–7.4)
NEUTROPHILS NFR BLD AUTO: 71 % — SIGNIFICANT CHANGE UP (ref 43–77)
NITRITE UR-MCNC: NEGATIVE — SIGNIFICANT CHANGE UP
NRBC # BLD: 0 /100 WBCS — SIGNIFICANT CHANGE UP (ref 0–0)
PH UR: 7 — SIGNIFICANT CHANGE UP (ref 5–8)
PLATELET # BLD AUTO: 222 K/UL — SIGNIFICANT CHANGE UP (ref 150–400)
POTASSIUM SERPL-MCNC: 4.6 MMOL/L — SIGNIFICANT CHANGE UP (ref 3.5–5.3)
POTASSIUM SERPL-SCNC: 4.6 MMOL/L — SIGNIFICANT CHANGE UP (ref 3.5–5.3)
PROT SERPL-MCNC: 7.7 G/DL — SIGNIFICANT CHANGE UP (ref 6–8.3)
PROT UR-MCNC: ABNORMAL
RBC # BLD: 4.31 M/UL — SIGNIFICANT CHANGE UP (ref 3.8–5.2)
RBC # FLD: 13.3 % — SIGNIFICANT CHANGE UP (ref 10.3–14.5)
RBC CASTS # UR COMP ASSIST: 1 /HPF — SIGNIFICANT CHANGE UP (ref 0–4)
SARS-COV-2 RNA SPEC QL NAA+PROBE: SIGNIFICANT CHANGE UP
SODIUM SERPL-SCNC: 132 MMOL/L — LOW (ref 135–145)
SP GR SPEC: 1.01 — SIGNIFICANT CHANGE UP (ref 1.01–1.02)
TROPONIN T, HIGH SENSITIVITY RESULT: 15 NG/L — SIGNIFICANT CHANGE UP (ref 0–51)
TROPONIN T, HIGH SENSITIVITY RESULT: 16 NG/L — SIGNIFICANT CHANGE UP (ref 0–51)
UROBILINOGEN FLD QL: NEGATIVE — SIGNIFICANT CHANGE UP
WBC # BLD: 8.02 K/UL — SIGNIFICANT CHANGE UP (ref 3.8–10.5)
WBC # FLD AUTO: 8.02 K/UL — SIGNIFICANT CHANGE UP (ref 3.8–10.5)
WBC UR QL: 2 /HPF — SIGNIFICANT CHANGE UP (ref 0–5)

## 2020-05-03 PROCEDURE — 99285 EMERGENCY DEPT VISIT HI MDM: CPT

## 2020-05-03 PROCEDURE — 99221 1ST HOSP IP/OBS SF/LOW 40: CPT | Mod: AI

## 2020-05-03 PROCEDURE — 71045 X-RAY EXAM CHEST 1 VIEW: CPT | Mod: 26

## 2020-05-03 PROCEDURE — 70450 CT HEAD/BRAIN W/O DYE: CPT | Mod: 26

## 2020-05-03 PROCEDURE — 73564 X-RAY EXAM KNEE 4 OR MORE: CPT | Mod: 26,RT

## 2020-05-03 PROCEDURE — 72170 X-RAY EXAM OF PELVIS: CPT | Mod: 26

## 2020-05-03 PROCEDURE — 72125 CT NECK SPINE W/O DYE: CPT | Mod: 26

## 2020-05-03 RX ORDER — SENNA PLUS 8.6 MG/1
2 TABLET ORAL AT BEDTIME
Refills: 0 | Status: DISCONTINUED | OUTPATIENT
Start: 2020-05-03 | End: 2020-05-07

## 2020-05-03 RX ORDER — ENOXAPARIN SODIUM 100 MG/ML
40 INJECTION SUBCUTANEOUS DAILY
Refills: 0 | Status: DISCONTINUED | OUTPATIENT
Start: 2020-05-03 | End: 2020-05-07

## 2020-05-03 RX ORDER — ATENOLOL 25 MG/1
1 TABLET ORAL
Qty: 0 | Refills: 0 | DISCHARGE

## 2020-05-03 RX ORDER — ATENOLOL 25 MG/1
25 TABLET ORAL DAILY
Refills: 0 | Status: DISCONTINUED | OUTPATIENT
Start: 2020-05-03 | End: 2020-05-07

## 2020-05-03 RX ORDER — LANOLIN ALCOHOL/MO/W.PET/CERES
3 CREAM (GRAM) TOPICAL ONCE
Refills: 0 | Status: DISCONTINUED | OUTPATIENT
Start: 2020-05-03 | End: 2020-05-06

## 2020-05-03 RX ORDER — ONDANSETRON 8 MG/1
4 TABLET, FILM COATED ORAL ONCE
Refills: 0 | Status: COMPLETED | OUTPATIENT
Start: 2020-05-03 | End: 2020-05-03

## 2020-05-03 RX ORDER — METOCLOPRAMIDE HCL 10 MG
10 TABLET ORAL ONCE
Refills: 0 | Status: COMPLETED | OUTPATIENT
Start: 2020-05-03 | End: 2020-05-03

## 2020-05-03 RX ORDER — HYDROCORTISONE 1 %
1 OINTMENT (GRAM) TOPICAL
Qty: 0 | Refills: 0 | DISCHARGE

## 2020-05-03 RX ORDER — ZINC OXIDE 200 MG/G
0 OINTMENT TOPICAL
Qty: 0 | Refills: 0 | DISCHARGE

## 2020-05-03 RX ORDER — SODIUM CHLORIDE 9 MG/ML
1000 INJECTION INTRAMUSCULAR; INTRAVENOUS; SUBCUTANEOUS
Refills: 0 | Status: DISCONTINUED | OUTPATIENT
Start: 2020-05-03 | End: 2020-05-04

## 2020-05-03 RX ORDER — MECLIZINE HCL 12.5 MG
25 TABLET ORAL ONCE
Refills: 0 | Status: COMPLETED | OUTPATIENT
Start: 2020-05-03 | End: 2020-05-03

## 2020-05-03 RX ADMIN — Medication 10 MILLIGRAM(S): at 23:12

## 2020-05-03 RX ADMIN — ONDANSETRON 4 MILLIGRAM(S): 8 TABLET, FILM COATED ORAL at 14:04

## 2020-05-03 RX ADMIN — SODIUM CHLORIDE 50 MILLILITER(S): 9 INJECTION INTRAMUSCULAR; INTRAVENOUS; SUBCUTANEOUS at 23:12

## 2020-05-03 RX ADMIN — ATENOLOL 25 MILLIGRAM(S): 25 TABLET ORAL at 18:58

## 2020-05-03 RX ADMIN — Medication 25 MILLIGRAM(S): at 14:04

## 2020-05-03 RX ADMIN — SODIUM CHLORIDE 50 MILLILITER(S): 9 INJECTION INTRAMUSCULAR; INTRAVENOUS; SUBCUTANEOUS at 18:58

## 2020-05-03 RX ADMIN — ENOXAPARIN SODIUM 40 MILLIGRAM(S): 100 INJECTION SUBCUTANEOUS at 18:58

## 2020-05-03 NOTE — ED ADULT NURSE NOTE - OBJECTIVE STATEMENT
Patient is a 80 yr old female with a PMH of HTN/hipsx who presents to the ED from home via EMS due to dizziness. Patient states she has been feeling nauseas/dizzy for the past few days and vomited 1x today. Patient also states she has fallen multiple times the last few days, the most recent being 2 days ago onto her face. Patient also reports some slurred speech. Upon assessment, patient is AOx4, right pupil 3RB, left pupil 3 RS, strong x4, sensation intact, afebrile, lung sounds clear upon ausculation, breathing spontaneously on RA, heart sounds S1/S2 present, abdomen round/soft/non tender/non distended/+bowel sounds all 4 quadrants, bruising present on b/l orbital eyes, bruising right knee/swelling/tender to touch, rash sacrum (patient states from diapers at home), +pulses, +2BLE non pitting edema, and denies d/headache/chills/fever/travel/CP/SOB/sick contact. Provider assessed patient at bedside, heplock placed with labs drawn/sent, all safety precautions maintained, call bell at bedside, and will continue to monitor. Patient is a 80 yr old female with a PMH of HTN/hipsx who presents to the ED from home via EMS due to dizziness. Patient states she has been feeling nauseas/dizzy for the past few days and vomited 1x today. Patient also states she has fallen multiple times the last few days, the most recent being 2 days ago onto her face. Patient also reports some slurred speech. Upon assessment, patient is AOx4, right pupil 3RB, left pupil 3 RS, strong x4, sensation intact, dysarthric, afebrile, lung sounds clear upon ausculation, breathing spontaneously on RA, heart sounds S1/S2 present, abdomen round/soft/non tender/non distended/+bowel sounds all 4 quadrants, bruising present on b/l orbital eyes, bruising right knee/swelling/tender to touch, rash sacrum (patient states from diapers at home), +pulses, +2BLE non pitting edema, and denies d/headache/chills/fever/travel/CP/SOB/sick contact. Provider assessed patient at bedside, heplock placed with labs drawn/sent, all safety precautions maintained, call bell at bedside, and will continue to monitor.

## 2020-05-03 NOTE — ED ADULT NURSE REASSESSMENT NOTE - NS ED NURSE REASSESS COMMENT FT1
Patient BP elevated at 180/75, and currently complains of nausea/stomach discomfort. Patient stated meds given earlier did help a little. Patient is still pending CT and repeat trop levels for dispo. All safety precautions maintained, assisted with female urinal, and will continue to monitor.

## 2020-05-03 NOTE — ED PROVIDER NOTE - PHYSICAL EXAMINATION
GEN - NAD; well appearing; A+Ox3   HEAD - NC/AT, No visible Ecchymosis, No Abrasions, No Lacerations/Skin Tears     EYES - EOMI, Pupils Non-Reactive and Fixed on Left; (+) Raccoon Eyes; (+) Bruising Para-Nasally  NECK - Neck supple, No LAD, No Swelling, No C-Spine Tenderness Focally  PULM - Coarse B/L,  symmetric breath sounds  COR -  RRR, S1 S2, no murmurs  ABD - NT/ND, soft, no guarding, no rebound, no masses    BACK - no CVA tenderness, nontender spine     EXTREMS - Knees without any swelling/erythema; 0+ edema, no gross deformity, warm and well perfused        NEUROLOGIC - alert, motor 5/5 RUE/LUE/RLE/LLE

## 2020-05-03 NOTE — ED ADULT NURSE REASSESSMENT NOTE - NS ED NURSE REASSESS COMMENT FT1
Care assumed of patient at this time. Orthostatic VS completed at this time. Patient updated to plan of care.

## 2020-05-03 NOTE — H&P ADULT - NSHPLABSRESULTS_GEN_ALL_CORE
LABS:                        13.1   8.02  )-----------( 222      ( 03 May 2020 14:00 )             39.3     05-03    132<L>  |  95<L>  |  13  ----------------------------<  120<H>  4.6   |  21<L>  |  0.73    Ca    10.7<H>      03 May 2020 14:00    TPro  7.7  /  Alb  4.3  /  TBili  1.2  /  DBili  x   /  AST  66<H>  /  ALT  67<H>  /  AlkPhos  82  05-03          Urinalysis Basic - ( 03 May 2020 14:00 )    Color: Light Yellow / Appearance: Clear / S.014 / pH: x  Gluc: x / Ketone: Negative  / Bili: Negative / Urobili: Negative   Blood: x / Protein: 100 mg/dL / Nitrite: Negative   Leuk Esterase: Negative / RBC: 1 /hpf / WBC 2 /HPF   Sq Epi: x / Non Sq Epi: 1 / Bacteria: x

## 2020-05-03 NOTE — H&P ADULT - HISTORY OF PRESENT ILLNESS
s/p fall  at  home    	   80 female, Hx: HTN (Atenolol); with recent syncopal workup 1 month prior for a previous fall . Presents 3 days after a fall on her face, reports she was feeling dizzy prior to falling, have trouble pronouncing words/findings words. Denies any preceding CP, SOB, nausea, vomiting, diarrhea, constipation, bloody stools, or dysuric symptoms. Today she presents with poorly localized abdominal pain and nausea X1 day.,  since  resolved  Denies any recent fevers; denies prior hx of CVA or MI, PE or DVT.

## 2020-05-03 NOTE — ED ADULT NURSE REASSESSMENT NOTE - NS ED NURSE REASSESS COMMENT FT1
L pupil is sluggish, R pupil is brisk. Patient states she had a vision problem since she was a child.

## 2020-05-03 NOTE — ED ADULT NURSE NOTE - NSIMPLEMENTINTERV_GEN_ALL_ED
Implemented All Fall Risk Interventions:  [place] to call system. Call bell, personal items and telephone within reach. Instruct patient to call for assistance. Room bathroom lighting operational. Non-slip footwear when patient is off stretcher. Physically safe environment: no spills, clutter or unnecessary equipment. Stretcher in lowest position, wheels locked, appropriate side rails in place. Provide visual cue, wrist band, yellow gown, etc. Monitor gait and stability. Monitor for mental status changes and reorient to person, place, and time. Review medications for side effects contributing to fall risk. Reinforce activity limits and safety measures with patient and family.

## 2020-05-03 NOTE — ED ADULT NURSE REASSESSMENT NOTE - NS ED NURSE REASSESS COMMENT FT1
Patient reports feeling nauseated.  She is alert and oriented to person, place, time and situation.  She is aware she is admitted to the hospital.

## 2020-05-03 NOTE — ED ADULT NURSE NOTE - SUICIDE SCREENING QUESTION 2
Problem: Pain, Acute (Adult)  Goal: Identify Related Risk Factors and Signs and Symptoms  Outcome: Ongoing (interventions implemented as appropriate)  Goal: Acceptable Pain Control/Comfort Level  Outcome: Ongoing (interventions implemented as appropriate)    Problem: Skin Integrity Impairment, Risk/Actual (Adult)  Goal: Identify Related Risk Factors and Signs and Symptoms  Outcome: Ongoing (interventions implemented as appropriate)  Goal: Skin Integrity/Wound Healing  Outcome: Ongoing (interventions implemented as appropriate)       No

## 2020-05-03 NOTE — H&P ADULT - NSHPPHYSICALEXAM_GEN_ALL_CORE
PHYSICAL EXAMINATION:  Vital Signs Last 24 Hrs  T(C): 36.6 (03 May 2020 16:36), Max: 36.8 (03 May 2020 13:08)  T(F): 97.9 (03 May 2020 16:36), Max: 98.2 (03 May 2020 13:08)  HR: 71 (03 May 2020 17:51) (64 - 71)  BP: 184/63 (03 May 2020 17:51) (162/82 - 184/63)  BP(mean): --  RR: 16 (03 May 2020 17:51) (16 - 18)  SpO2: 98% (03 May 2020 17:51) (98% - 99%)  CAPILLARY BLOOD GLUCOSE            GENERAL: NAD, well-groomed,  HEAD:  atraumatic, normocephalic  EYES: sclera anicteric  ENMT: mucous membranes moist  NECK: supple, No JVD  CHEST/LUNG: clear to auscultation bilaterally;    no      rales   ,   no rhonchi,   HEART: normal S1, S2  ABDOMEN: BS+, soft, ND, NT   EXTREMITIES:    no    edema    b/l LEs  NEURO: awake, ,     moves all extremities  SKIN: no     rash

## 2020-05-03 NOTE — H&P ADULT - ASSESSMENT
79F        hx HTN, right hip replacement, h/o falls,    on  last  visit, on  3/27/20,    ct pelvis, Left acetabular fx/ Left inf pubic  ramus  fx  seen by ortho, no  intervention  HTN, on atenolol/  COVID  19.. negative, on  3/ 27  and  on  3/31      now  admitted  with  h/o  dizziness ,  and  a  fall,  3  days  prior to  admission  h/o  unsteady  gait   ct  head,  no    bleed    cxr,  normal    x ray   knee/  pelvis, no  fx   PT  eval    on  dvt  ppx  check  orthostatics   mild  hyponatremia  and   mild  hypercalcemia, on iv  fluids     from: CT Chest No Cont (03.31.20 @ 16:35) >  IMPRESSION:  Clear lungs. No consolidations, edema, effusion or pneumothorax.  Incidental findings as above  < end of copied text >     < from: CT 3D Reconstruct w/ Workstation (03.27.20 @ 00:32) >  es are noted in the anterior right hip.  IMPRESSION:  1. Acute, nondisplaced fracture of the anterior left acetabulum.  2. Acute, nondisplaced of the left inferior pubic ramus.  < end of copied text >     < from: Xray Knee 3 Views, Left (03.26.20 @ 22:24) >  IMPRESSION:  There is a lucency at the anterolateral aspect of the tibia best seen on the frontal x-ray of the left knee. Evaluation is limited secondary to overlying material, however this may represent a fracture. Correlate with point tenderness.. CT of the knee can be obtained if clinically indicated  < end of copied text > 79F        hx HTN, right hip replacement, h/o falls,    on  last  visit, on  3/27/20,    ct pelvis, Left acetabular fx/ Left inf pubic  ramus  fx, per  ortho, no  intervention  HTN, on atenolol/ c/c  anemia,  elevated  lfts/  ct  abdomen in 2019,  normal  liver  COVID  19.. negative, on  3/ 27  and  on  3/31      now  admitted  with  h/o  dizziness ,  and  a  fall,  3  days  prior to  admission  h/o  unsteady  gait   ct  head,  no    bleed    cxr,  normal    x ray   knee/  pelvis, no  fx   PT  eval    on  dvt  ppx  check  orthostatics   mild  hyponatremia  and   mild  hypercalcemia, on iv  fluids     from: CT Chest No Cont (03.31.20 @ 16:35) >  IMPRESSION:  Clear lungs. No consolidations, edema, effusion or pneumothorax.  Incidental findings as above  < end of copied text >     < from: CT 3D Reconstruct w/ Workstation (03.27.20 @ 00:32) >  es are noted in the anterior right hip.  IMPRESSION:  1. Acute, nondisplaced fracture of the anterior left acetabulum.  2. Acute, nondisplaced of the left inferior pubic ramus.  < end of copied text >     < from: Xray Knee 3 Views, Left (03.26.20 @ 22:24) >  IMPRESSION:  There is a lucency at the anterolateral aspect of the tibia best seen on the frontal x-ray of the left knee. Evaluation is limited secondary to overlying material, however this may represent a fracture. Correlate with point tenderness.. CT of the knee can be obtained if clinically indicated  < end of copied text >

## 2020-05-03 NOTE — ED ADULT NURSE REASSESSMENT NOTE - NS ED NURSE REASSESS COMMENT FT1
Patient is cleaned and changed and has new sheets placed under her. patient refuses diaper at this time, pt is using the female urinal. patient is given food as per diet order

## 2020-05-03 NOTE — ED PROVIDER NOTE - CLINICAL SUMMARY MEDICAL DECISION MAKING FREE TEXT BOX
80 female, Hx: HTN (Atenolol); with recent syncopal workup 1 month prior for a previous fall. Presents 3 days after a fall on her face, reports she was feeling dizzy prior to falling, have trouble pronouncing words/findings words. Denies any preceding CP, SOB, nausea, vomiting, diarrhea, constipation, bloody stools, or dysuric symptoms. Today she presents with poorly localized abdominal pain and nausea X1 day. Denies any recent fevers; denies prior hx of CVA or MI, PE or DVT. Exam (raccoon eyes, non-reactive left pupil, coarse lungs, calf tenderness L, right knee bony tenderness), presentation, and history concerning for CVA vs. Intracranial Hemm vs. PNA vs. Metabolic etiology to her fall. Plan: CBC, CMP, EKG, CXR, UA, UCx, CT Head/C-Spine, Troponin.

## 2020-05-04 LAB
ANION GAP SERPL CALC-SCNC: 15 MMOL/L — SIGNIFICANT CHANGE UP (ref 5–17)
BUN SERPL-MCNC: 13 MG/DL — SIGNIFICANT CHANGE UP (ref 7–23)
CALCIUM SERPL-MCNC: 10.2 MG/DL — SIGNIFICANT CHANGE UP (ref 8.4–10.5)
CHLORIDE SERPL-SCNC: 101 MMOL/L — SIGNIFICANT CHANGE UP (ref 96–108)
CO2 SERPL-SCNC: 21 MMOL/L — LOW (ref 22–31)
CREAT SERPL-MCNC: 0.81 MG/DL — SIGNIFICANT CHANGE UP (ref 0.5–1.3)
GLUCOSE SERPL-MCNC: 97 MG/DL — SIGNIFICANT CHANGE UP (ref 70–99)
HCT VFR BLD CALC: 38.2 % — SIGNIFICANT CHANGE UP (ref 34.5–45)
HGB BLD-MCNC: 12.3 G/DL — SIGNIFICANT CHANGE UP (ref 11.5–15.5)
MCHC RBC-ENTMCNC: 29.6 PG — SIGNIFICANT CHANGE UP (ref 27–34)
MCHC RBC-ENTMCNC: 32.2 GM/DL — SIGNIFICANT CHANGE UP (ref 32–36)
MCV RBC AUTO: 92 FL — SIGNIFICANT CHANGE UP (ref 80–100)
NRBC # BLD: 0 /100 WBCS — SIGNIFICANT CHANGE UP (ref 0–0)
PLATELET # BLD AUTO: 238 K/UL — SIGNIFICANT CHANGE UP (ref 150–400)
POTASSIUM SERPL-MCNC: 3.9 MMOL/L — SIGNIFICANT CHANGE UP (ref 3.5–5.3)
POTASSIUM SERPL-SCNC: 3.9 MMOL/L — SIGNIFICANT CHANGE UP (ref 3.5–5.3)
RBC # BLD: 4.15 M/UL — SIGNIFICANT CHANGE UP (ref 3.8–5.2)
RBC # FLD: 13.4 % — SIGNIFICANT CHANGE UP (ref 10.3–14.5)
SODIUM SERPL-SCNC: 137 MMOL/L — SIGNIFICANT CHANGE UP (ref 135–145)
WBC # BLD: 8.45 K/UL — SIGNIFICANT CHANGE UP (ref 3.8–10.5)
WBC # FLD AUTO: 8.45 K/UL — SIGNIFICANT CHANGE UP (ref 3.8–10.5)

## 2020-05-04 PROCEDURE — 99232 SBSQ HOSP IP/OBS MODERATE 35: CPT

## 2020-05-04 PROCEDURE — 93306 TTE W/DOPPLER COMPLETE: CPT | Mod: 26

## 2020-05-04 RX ORDER — MECLIZINE HCL 12.5 MG
12.5 TABLET ORAL
Refills: 0 | Status: DISCONTINUED | OUTPATIENT
Start: 2020-05-04 | End: 2020-05-07

## 2020-05-04 RX ORDER — ALPRAZOLAM 0.25 MG
0.25 TABLET ORAL ONCE
Refills: 0 | Status: DISCONTINUED | OUTPATIENT
Start: 2020-05-04 | End: 2020-05-04

## 2020-05-04 RX ADMIN — Medication 12.5 MILLIGRAM(S): at 12:02

## 2020-05-04 RX ADMIN — Medication 0.25 MILLIGRAM(S): at 14:40

## 2020-05-04 RX ADMIN — ATENOLOL 25 MILLIGRAM(S): 25 TABLET ORAL at 05:19

## 2020-05-04 RX ADMIN — ENOXAPARIN SODIUM 40 MILLIGRAM(S): 100 INJECTION SUBCUTANEOUS at 12:03

## 2020-05-04 RX ADMIN — SENNA PLUS 2 TABLET(S): 8.6 TABLET ORAL at 21:26

## 2020-05-04 NOTE — CONSULT NOTE ADULT - ASSESSMENT
pt is well known to me from the last admission.  80 female, Hx: HTN (Atenolol); with recent syncopal workup 1 month prior for a previous fall . Presents 3 days after a fall on her face, reports she was feeling dizzy prior to falling, have trouble pronouncing words/findings words. Denies any preceding CP, SOB, nausea, vomiting, diarrhea, constipation, bloody stools, or dysuric symptoms. Today she presents with poorly localized abdominal pain and nausea X1 day.,  since  resolved  Denies any recent fevers; denies prior hx of CVA or MI, PE or DVT.   pt with multiple syncopal episode since last admission   last admission HCTZ  was dcd  pt with known hx of cad, non orthostatic on exam  echo  will consider loop if pt agrees  physical therapy  dvt prophylaxis  asa daily

## 2020-05-04 NOTE — CONSULT NOTE ADULT - SUBJECTIVE AND OBJECTIVE BOX
CHIEF COMPLAINT:Patient is a 80y old  Female who presents with a chief complaint of s/p fall (04 May 2020 08:14)      HPI:  pt is well known to me from the last admission.  80 female, Hx: HTN (Atenolol); with recent syncopal workup 1 month prior for a previous fall . Presents 3 days after a fall on her face, reports she was feeling dizzy prior to falling, have trouble pronouncing words/findings words. Denies any preceding CP, SOB, nausea, vomiting, diarrhea, constipation, bloody stools, or dysuric symptoms. Today she presents with poorly localized abdominal pain and nausea X1 day.,  since  resolved  Denies any recent fevers; denies prior hx of CVA or MI, PE or DVT. (03 May 2020 18:22)      PAST MEDICAL & SURGICAL HISTORY:  HTN (hypertension)  No pertinent past medical history  History of right hip replacement  S/P PING (total abdominal hysterectomy)      MEDICATIONS  (STANDING):  ATENolol  Tablet 25 milliGRAM(s) Oral daily  enoxaparin Injectable 40 milliGRAM(s) SubCutaneous daily  melatonin 3 milliGRAM(s) Oral once  senna 2 Tablet(s) Oral at bedtime    MEDICATIONS  (PRN):      FAMILY HISTORY:      SOCIAL HISTORY:    [ ] Non-smoker  [ ] Smoker  [ ] Alcohol    Allergies    Cipro (Nausea)  contrast media (gadolinium-based) (Faint)  Minocin (Nausea)    Intolerances    	    REVIEW OF SYSTEMS:  CONSTITUTIONAL: No fever, weight loss, or fatigue  EYES: No eye pain, visual disturbances, or discharge  ENT:  No difficulty hearing, tinnitus, vertigo; No sinus or throat pain  NECK: No pain or stiffness  RESPIRATORY: No cough, wheezing, chills or hemoptysis; + Shortness of Breath  CARDIOVASCULAR: No chest pain, palpitations, passing out, dizziness, or leg swelling  GASTROINTESTINAL: No abdominal or epigastric pain. No nausea, vomiting, or hematemesis; No diarrhea or constipation. No melena or hematochezia.  GENITOURINARY: No dysuria, frequency, hematuria, or incontinence  NEUROLOGICAL: No headaches, memory loss, loss of strength, numbness, or tremors  SKIN: No itching, burning, rashes, or lesions   LYMPH Nodes: No enlarged glands  ENDOCRINE: No heat or cold intolerance; No hair loss  MUSCULOSKELETAL: No joint pain or swelling; No muscle, back, or extremity pain  PSYCHIATRIC: No depression, anxiety, mood swings, or difficulty sleeping  HEME/LYMPH: No easy bruising, or bleeding gums  ALLERGY AND IMMUNOLOGIC: No hives or eczema	    [ ] All others negative	  [ ] Unable to obtain    PHYSICAL EXAM:  T(C): 36.9 (05-04-20 @ 05:04), Max: 37 (05-03-20 @ 20:55)  HR: 64 (05-04-20 @ 05:04) (64 - 71)  BP: 149/82 (05-04-20 @ 05:04) (131/67 - 184/63)  RR: 18 (05-04-20 @ 05:04) (16 - 18)  SpO2: 97% (05-04-20 @ 05:04) (97% - 100%)  Wt(kg): --  I&O's Summary      Appearance: Normal	  HEENT:   Normal oral mucosa, PERRL, EOMI	  Lymphatic: No lymphadenopathy  Cardiovascular: Normal S1 S2, No JVD,+ murmurs, No edema  Respiratory: Lungs clear to auscultation	  Psychiatry: A & O x 3, Mood & affect appropriate  Gastrointestinal:  Soft, Non-tender, + BS	  Skin: No rashes, No ecchymoses, No cyanosis	  Neurologic: Non-focal  Extremities: Normal range of motion, No clubbing, cyanosis or edema  Vascular: Peripheral pulses palpable 2+ bilaterally    TELEMETRY: 	    ECG:  	  RADIOLOGY:  OTHER: 	  	  LABS:	 	    CARDIAC MARKERS:                              12.3   8.45  )-----------( 238      ( 04 May 2020 06:33 )             38.2     05-04    137  |  101  |  13  ----------------------------<  97  3.9   |  21<L>  |  0.81    Ca    10.2      04 May 2020 06:33    TPro  7.7  /  Alb  4.3  /  TBili  1.2  /  DBili  x   /  AST  66<H>  /  ALT  67<H>  /  AlkPhos  82  05-03    proBNP:   Lipid Profile:   HgA1c:   TSH:       PREVIOUS DIAGNOSTIC TESTING:    < from: 12 Lead ECG (03.28.20 @ 11:45) >  Ventricular Rate 73 BPM    Atrial Rate 73 BPM    P-R Interval 178 ms    QRS Duration 88 ms    Q-T Interval 398 ms    QTC Calculation(Bezet) 438 ms    P Axis 39 degrees    R Axis 5 degrees    T Axis 28 degrees    Diagnosis Line NORMAL SINUS RHYTHM  NORMAL ECG    < from: CT Head No Cont (05.03.20 @ 16:06) >  Straightening of the cervical lordosis due to muscle spasm and/or positioning. There is no prevertebral soft tissue swelling. Visualized airways are patent. Partially imaged lung apices are clear.     Vertebral body heights and alignment are maintained without compression deformity. The atlantodental and atlanto-occipital joints are maintained. Multilevel moderate degenerative changes presented as intervertebral disc space narrowing, subchondral and facet joint sclerosis, ligamentum flavum hypertrophy and marginal vertebral body osteophyte formation.    Thyroid gland is heterogeneous with calcifications.    IMPRESSION:     CT BRAIN: No acute intracranial bleeding, mass effect, or shift.     CT CERVICAL SPINE: No fracture or subluxation. Straightened lordosis. Multilevel degenerative changes as described above.    < from: CT Chest No Cont (03.31.20 @ 16:35) >  INTERPRETATION:  Reason for Exam:  Questionable opacity seen on recent chest x-ray    CT of the chest was performed from the thoracic inlet to the level of the adrenal glands without contrast injection.    Comparison: Chest x-ray of same date as well as CT chest dated August 7, 2017    Tubes/Lines: None.    Mediastinum/Vessels/Heart: Aorta and pulmonary arteries are normal in size. There is no pericardial effusion. No lymphadenopathy. Coronary artery calcifications are noted. Multiple small thyroid nodules are seen measuring 2-3 mm.    Lungs/Pleura/Airways: The lungs are clear. No consolidations, edema, effusion or pneumothorax. Minimal bilateral mosaic attenuation noted due to respiratory evaluation variation. Incidental note made of left lower lobe 4 mm nodule, stable since August 2017 chest CT.    Visualized abdomen: Left renal cyst identified. Upper abdomen is otherwise unremarkable.     Bones and softtissues: No suspicious osseous lesions. Degenerative changes noted throughout the spine.    IMPRESSION:    Clear lungs. No consolidations, edema, effusion or pneumothorax.    < from: 12 Lead ECG (03.28.20 @ 11:45) >  Ventricular Rate 73 BPM    Atrial Rate 73 BPM    P-R Interval 178 ms    QRS Duration 88 ms    Q-T Interval 398 ms    QTC Calculation(Bezet) 438 ms    P Axis 39 degrees    R Axis 5 degrees    T Axis 28 degrees    Diagnosis Line NORMAL SINUS RHYTHM  NORMAL ECG    COVID-19 PCR . (05.03.20 @ 18:26)    COVID-19 PCR: NotDetec: This test has been validated by [employer] to be accurate;  though it has not been FDA cleared/approved by the usual pathway.  As with all laboratory tests, results should be correlated with clinical  findings.  https://www.fda.gov/media/723446/download  https://www.fda.gov/media/478934/download

## 2020-05-04 NOTE — PROGRESS NOTE ADULT - SUBJECTIVE AND OBJECTIVE BOX
afebrile  REVIEW OF SYSTEMS:  GEN: no fever,    no chills  RESP: no SOB,   no cough  CVS: no chest pain,   no palpitations  GI: no abdominal pain,   no nausea,   no vomiting,   no constipation,   no diarrhea  : no dysuria,   no frequency  NEURO: no headache,   no dizziness  PSYCH: no depression,   not anxious  Derm : no rash    MEDICATIONS  (STANDING):  ATENolol  Tablet 25 milliGRAM(s) Oral daily  enoxaparin Injectable 40 milliGRAM(s) SubCutaneous daily  melatonin 3 milliGRAM(s) Oral once  senna 2 Tablet(s) Oral at bedtime  sodium chloride 0.9%. 1000 milliLiter(s) (50 mL/Hr) IV Continuous <Continuous>    MEDICATIONS  (PRN):      Vital Signs Last 24 Hrs  T(C): 36.9 (04 May 2020 05:04), Max: 37 (03 May 2020 20:55)  T(F): 98.5 (04 May 2020 05:04), Max: 98.6 (03 May 2020 20:55)  HR: 64 (04 May 2020 05:04) (64 - 71)  BP: 149/82 (04 May 2020 05:04) (131/67 - 184/63)  BP(mean): --  RR: 18 (04 May 2020 05:04) (16 - 18)  SpO2: 97% (04 May 2020 05:04) (97% - 100%)  CAPILLARY BLOOD GLUCOSE        I&O's Summary      PHYSICAL EXAM:  HEAD:  Atraumatic, Normocephalic  NECK: Supple, No   JVD  CHEST/LUNG:   no     rales,     no,    rhonchi  HEART: Regular rate and rhythm;         murmur  ABDOMEN: Soft, Nontender, ;   EXTREMITIES:    no    edema  NEUROLOGY:  alert    LABS:                        12.3   8.45  )-----------( 238      ( 04 May 2020 06:33 )             38.2     05-04    137  |  101  |  13  ----------------------------<  97  3.9   |  21<L>  |  0.81    Ca    10.2      04 May 2020 06:33    TPro  7.7  /  Alb  4.3  /  TBili  1.2  /  DBili  x   /  AST  66<H>  /  ALT  67<H>  /  AlkPhos  82  05-03          Urinalysis Basic - ( 03 May 2020 14:00 )    Color: Light Yellow / Appearance: Clear / S.014 / pH: x  Gluc: x / Ketone: Negative  / Bili: Negative / Urobili: Negative   Blood: x / Protein: 100 mg/dL / Nitrite: Negative   Leuk Esterase: Negative / RBC: 1 /hpf / WBC 2 /HPF   Sq Epi: x / Non Sq Epi: 1 / Bacteria: x                      Consultant(s) Notes Reviewed:      Care Discussed with Consultants/Other Providers:

## 2020-05-04 NOTE — PROGRESS NOTE ADULT - ASSESSMENT
79F        hx HTN, right hip replacement, h/o falls,    on  last  visit, on  3/27/20,    ct pelvis, Left acetabular fx/ Left inf pubic  ramus  fx, per  ortho, no  intervention  HTN, on atenolol/ c/c  anemia,  elevated  lfts/  ct  abdomen in 2019,  normal  liver  COVID  19.. negative, on  3/ 27  and  on  3/31      now  admitted  with  h/o  dizziness ,  and  a  fall,  3  days  prior to  admission  h/o  unsteady  gait   ct  head,  no    bleed    cxr,  normal    x ray   knee/  pelvis, no  fx  check  orthostatics   mild  hyponatremia  and   mild  hypercalcemia, on iv  fluids. ha s resolved  PT  eval pending/  await  disposition     from: CT Chest No Cont (03.31.20 @ 16:35) >  IMPRESSION:  Clear lungs. No consolidations, edema, effusion or pneumothorax.  Incidental findings as above  < end of copied text >     < from: CT 3D Reconstruct w/ Workstation (03.27.20 @ 00:32) >  es are noted in the anterior right hip.  IMPRESSION:  1. Acute, nondisplaced fracture of the anterior left acetabulum.  2. Acute, nondisplaced of the left inferior pubic ramus.  < end of copied text >     < from: Xray Knee 3 Views, Left (03.26.20 @ 22:24) >  IMPRESSION:  There is a lucency at the anterolateral aspect of the tibia best seen on the frontal x-ray of the left knee. Evaluation is limited secondary to overlying material, however this may represent a fracture. Correlate with point tenderness.. CT of the knee can be obtained if clinically indicated  < end of copied text > 79F        hx HTN, right hip replacement, h/o falls,    on  last  visit, on  3/27/20,    ct pelvis, Left acetabular fx/ Left inf pubic  ramus  fx, per  ortho, no  intervention  HTN, on atenolol/ c/c  anemia,  elevated  lfts/  ct  abdomen in 2019,  normal  liver  COVID  19.. negative, on  3/ 27  and  on  3/31      now  admitted  with  h/o  dizziness ,  and  a  fall,  3  days  prior to  admission  ha s jamir orbital  ecchymoses  h/o  unsteady  gait   ct  head,  no    bleed    cxr,  normal    x ray   knee/  pelvis, no  fx    orthostatics ,  negative  mild  hyponatremia  and   mild  hypercalcemia, on iv  fluids. has  resolved  PT  eval pending/  await  disposition  and  card eval  pe r card,  needs  echo     from: CT Chest No Cont (03.31.20 @ 16:35) >  IMPRESSION:  Clear lungs. No consolidations, edema, effusion or pneumothorax.  Incidental findings as above  < end of copied text >     < from: CT 3D Reconstruct w/ Workstation (03.27.20 @ 00:32) >  es are noted in the anterior right hip.  IMPRESSION:  1. Acute, nondisplaced fracture of the anterior left acetabulum.  2. Acute, nondisplaced of the left inferior pubic ramus.  < end of copied text >     < from: Xray Knee 3 Views, Left (03.26.20 @ 22:24) >  IMPRESSION:  There is a lucency at the anterolateral aspect of the tibia best seen on the frontal x-ray of the left knee. Evaluation is limited secondary to overlying material, however this may represent a fracture. Correlate with point tenderness.. CT of the knee can be obtained if clinically indicated  < end of copied text >

## 2020-05-05 ENCOUNTER — TRANSCRIPTION ENCOUNTER (OUTPATIENT)
Age: 80
End: 2020-05-05

## 2020-05-05 DIAGNOSIS — R42 DIZZINESS AND GIDDINESS: ICD-10-CM

## 2020-05-05 LAB
-  AMIKACIN: SIGNIFICANT CHANGE UP
-  AMPICILLIN/SULBACTAM: SIGNIFICANT CHANGE UP
-  AMPICILLIN: SIGNIFICANT CHANGE UP
-  AZTREONAM: SIGNIFICANT CHANGE UP
-  CEFAZOLIN: SIGNIFICANT CHANGE UP
-  CEFEPIME: SIGNIFICANT CHANGE UP
-  CEFOXITIN: SIGNIFICANT CHANGE UP
-  CEFTRIAXONE: SIGNIFICANT CHANGE UP
-  CIPROFLOXACIN: SIGNIFICANT CHANGE UP
-  GENTAMICIN: SIGNIFICANT CHANGE UP
-  LEVOFLOXACIN: SIGNIFICANT CHANGE UP
-  MEROPENEM: SIGNIFICANT CHANGE UP
-  NITROFURANTOIN: SIGNIFICANT CHANGE UP
-  PIPERACILLIN/TAZOBACTAM: SIGNIFICANT CHANGE UP
-  TOBRAMYCIN: SIGNIFICANT CHANGE UP
-  TRIMETHOPRIM/SULFAMETHOXAZOLE: SIGNIFICANT CHANGE UP
CULTURE RESULTS: SIGNIFICANT CHANGE UP
METHOD TYPE: SIGNIFICANT CHANGE UP
ORGANISM # SPEC MICROSCOPIC CNT: SIGNIFICANT CHANGE UP
ORGANISM # SPEC MICROSCOPIC CNT: SIGNIFICANT CHANGE UP
SPECIMEN SOURCE: SIGNIFICANT CHANGE UP

## 2020-05-05 PROCEDURE — 99232 SBSQ HOSP IP/OBS MODERATE 35: CPT

## 2020-05-05 RX ORDER — CARBAMIDE PEROXIDE 81.86 MG/ML
4 SOLUTION/ DROPS AURICULAR (OTIC)
Refills: 0 | Status: DISCONTINUED | OUTPATIENT
Start: 2020-05-05 | End: 2020-05-07

## 2020-05-05 RX ORDER — LISINOPRIL 2.5 MG/1
5 TABLET ORAL DAILY
Refills: 0 | Status: DISCONTINUED | OUTPATIENT
Start: 2020-05-05 | End: 2020-05-06

## 2020-05-05 RX ADMIN — ENOXAPARIN SODIUM 40 MILLIGRAM(S): 100 INJECTION SUBCUTANEOUS at 12:11

## 2020-05-05 RX ADMIN — LISINOPRIL 5 MILLIGRAM(S): 2.5 TABLET ORAL at 12:11

## 2020-05-05 RX ADMIN — ATENOLOL 25 MILLIGRAM(S): 25 TABLET ORAL at 05:04

## 2020-05-05 RX ADMIN — Medication 12.5 MILLIGRAM(S): at 05:04

## 2020-05-05 NOTE — PHYSICAL THERAPY INITIAL EVALUATION ADULT - ADDITIONAL COMMENTS
Pt resides in a  with her spouse +3 steps to enter with HR. Recently D/C'd from subacute rehab where she Was ambulating (I) with R/W, independent with ADLs. Owns commode, transport chair, R/W.

## 2020-05-05 NOTE — PROGRESS NOTE ADULT - SUBJECTIVE AND OBJECTIVE BOX
no cp/sob    REVIEW OF SYSTEMS:  GEN: no fever,    no chills  RESP: no SOB,   no cough  CVS: no chest pain,   no palpitations  GI: no abdominal pain,   no nausea,   no vomiting,   no constipation,   no diarrhea  : no dysuria,   no frequency  NEURO: no headache,   no dizziness  PSYCH: no depression,   not anxious  Derm : no rash    MEDICATIONS  (STANDING):  ATENolol  Tablet 25 milliGRAM(s) Oral daily  enoxaparin Injectable 40 milliGRAM(s) SubCutaneous daily  meclizine 12.5 milliGRAM(s) Oral two times a day  melatonin 3 milliGRAM(s) Oral once  senna 2 Tablet(s) Oral at bedtime    MEDICATIONS  (PRN):      Vital Signs Last 24 Hrs  T(C): 37.1 (04 May 2020 13:10), Max: 37.1 (04 May 2020 13:10)  T(F): 98.7 (04 May 2020 13:10), Max: 98.7 (04 May 2020 13:10)  HR: 67 (04 May 2020 15:50) (67 - 73)  BP: 157/80 (04 May 2020 15:50) (157/80 - 178/82)  BP(mean): --  RR: 16 (04 May 2020 15:50) (16 - 18)  SpO2: 99% (04 May 2020 15:50) (97% - 99%)  CAPILLARY BLOOD GLUCOSE        I&O's Summary    04 May 2020 07:01  -  05 May 2020 07:00  --------------------------------------------------------  IN: 240 mL / OUT: 40 mL / NET: 200 mL        PHYSICAL EXAM:  HEAD:  Atraumatic, Normocephalic  NECK: Supple, No   JVD  CHEST/LUNG:   no     rales,     no,    rhonchi  HEART: Regular rate and rhythm;         murmur  ABDOMEN: Soft, Nontender, ;   EXTREMITIES:    no    edema  NEUROLOGY:  alert    LABS:                        12.3   8.45  )-----------( 238      ( 04 May 2020 06:33 )             38.2     05-04    137  |  101  |  13  ----------------------------<  97  3.9   |  21<L>  |  0.81    Ca    10.2      04 May 2020 06:33    TPro  7.7  /  Alb  4.3  /  TBili  1.2  /  DBili  x   /  AST  66<H>  /  ALT  67<H>  /  AlkPhos  82  05-03          Urinalysis Basic - ( 03 May 2020 14:00 )    Color: Light Yellow / Appearance: Clear / S.014 / pH: x  Gluc: x / Ketone: Negative  / Bili: Negative / Urobili: Negative   Blood: x / Protein: 100 mg/dL / Nitrite: Negative   Leuk Esterase: Negative / RBC: 1 /hpf / WBC 2 /HPF   Sq Epi: x / Non Sq Epi: 1 / Bacteria: x                      Consultant(s) Notes Reviewed:      Care Discussed with Consultants/Other Providers:

## 2020-05-05 NOTE — PHYSICAL THERAPY INITIAL EVALUATION ADULT - LIVES WITH, PROFILE
spouse Pt resides in private home with spouse and 3 steps to enter +handrail, no stairs to negotiate inside the home. Pt recently discharged from subacute rehab where she was ambulating independently with RW, and independent with ADLs. Pt reports receiving assist from spouse for high level ADLs (cooking, cleaning). Pt reports owning commode, transport wheelchair and RW./spouse

## 2020-05-05 NOTE — DISCHARGE NOTE PROVIDER - HOSPITAL COURSE
79F hx HTN, right hip replacement, h/o falls, on  last  visit, on  3/27/20,    ct pelvis, Left acetabular fx/ Left inf pubic  ramus  fx, per  ortho, no  intervention    HTN, on atenolol/ c/c  anemia,  elevated  lfts/  ct  abdomen in 2019,  normal  liver    COVID  19 negative, on  3/ 27  and  on  3/31    now  admitted  with  h/o  dizziness ,  and  a  fall,  3  days  prior to  admission    ha s jamir orbital  ecchymoses    h/o  unsteady  gait    ct  head,  no bleed    cxr, normal    x ray  knee/ pelvis, no  fx    orthostatics negative    mild  hyponatremia and  mild  hypercalcemia, on iv  fluids. has  resolved    echo,  normal  ef    PT eval 79F hx HTN, right hip replacement, h/o falls, on  last  visit, on  3/27/20,    ct pelvis, Left acetabular fx/ Left inf pubic  ramus  fx, per  ortho, no  intervention    HTN, on atenolol/ c/c  anemia,  elevated  lfts/  ct  abdomen in 2019,  normal  liver    COVID  19 negative, on  3/ 27  and  on  3/31    now  admitted  with  h/o  dizziness ,  and  a  fall,  3  days  prior to  admission    ha s jamir orbital  ecchymoses    h/o  unsteady  gait    ct  head,  no bleed    cxr, normal    x ray  knee/ pelvis, no  fx    orthostatics negative    mild  hyponatremia and  mild  hypercalcemia, on iv  fluids. has  resolved    echo,  normal  ef    PT eval [facility]

## 2020-05-05 NOTE — PHYSICAL THERAPY INITIAL EVALUATION ADULT - PRECAUTIONS/LIMITATIONS, REHAB EVAL
Pt also c/o poorly localized abdominal pain and nausea X1 day, since  resolved. X-ray Pelvis 4/3: No acute fracture or dislocation.  CTH 4/3: No acute intracranial bleeding, mass effect, or shift. CT CERVICAL SPINE: No fracture or subluxation. Straightened lordosis. Multilevel degenerative changes as described above. Of note pt recently admitted for fall (3/27)  ct pelvis, +Left acetabular fx/ Left inf pubic ramus fx, per ortho, no intervention and pt WBAT/fall precautions

## 2020-05-05 NOTE — CONSULT NOTE ADULT - ASSESSMENT
80F s/p fall h/o HTN now complaining of dizziness/lightheadedness which lasts a few seconds at a time and is associated with nausea without any vomiting for the past few days. Pt states that she is usually lightheaded however she does feel a room spinning sensation with nausea occasionally.

## 2020-05-05 NOTE — CONSULT NOTE ADULT - SUBJECTIVE AND OBJECTIVE BOX
CC: lightheadedness    HPI:  80 female, Hx: HTN (Atenolol); with recent syncopal workup 1 month prior for a previous fall. Presents 3 days after a fall on her face, reports she was feeling dizzy prior to falling, have trouble pronouncing words/findings words. Denies any preceding CP, SOB, nausea, vomiting, diarrhea, constipation, bloody stools, or dysuric symptoms. Today she presents with poorly localized abdominal pain and nausea X1 day, since  resolved. Denies any recent fevers; denies prior hx of CVA or MI, PE or DVT.     Pt states that she feels like she is moreso lightheaded, stating that she feels like shes "in the clouds." However states that she experiences occasional room spinning associated with nausea as well which lasts a few seconds at a time for the past few days. No vomiting. Pt denies tinnitus, ear pain, congestion, recent URI, otorrhea, hearing loss, hx of sx or trauma or recent travel.       PAST MEDICAL & SURGICAL HISTORY:  HTN (hypertension)  No pertinent past medical history  History of right hip replacement  S/P PING (total abdominal hysterectomy)    Allergies    Cipro (Nausea)  contrast media (gadolinium-based) (Faint)  Minocin (Nausea)    Intolerances      MEDICATIONS  (STANDING):  ATENolol  Tablet 25 milliGRAM(s) Oral daily  enoxaparin Injectable 40 milliGRAM(s) SubCutaneous daily  lisinopril 5 milliGRAM(s) Oral daily  meclizine 12.5 milliGRAM(s) Oral two times a day  melatonin 3 milliGRAM(s) Oral once  senna 2 Tablet(s) Oral at bedtime    MEDICATIONS  (PRN):      Social History: no pertinent social history    Family history: no pertinent family history    ROS:   ENT: all negative except as noted in HPI   CV: denies palpitations  Pulm: denies SOB, cough, hemoptysis  GI: denies change in appetite indigestion, n/v  : denies pertinent urinary symptoms, urgency  Neuro: denies numbness/tingling, loss of sensation  Psych: denies anxiety  MS: denies muscle weakness, instability  Heme: denies easy bruising or bleeding  Endo: denies heat/cold intolerance, excessive sweating  Vascular: denies LE edema    Vital Signs Last 24 Hrs  T(C): 36.7 (05 May 2020 12:00), Max: 36.7 (05 May 2020 12:00)  T(F): 98 (05 May 2020 12:00), Max: 98 (05 May 2020 12:00)  HR: 67 (05 May 2020 12:00) (67 - 67)  BP: 173/77 (05 May 2020 12:00) (173/77 - 173/77)  BP(mean): --  RR: 17 (05 May 2020 12:00) (17 - 17)  SpO2: 98% (05 May 2020 12:00) (98% - 98%)                          12.3   8.45  )-----------( 238      ( 04 May 2020 06:33 )             38.2    05-04    137  |  101  |  13  ----------------------------<  97  3.9   |  21<L>  |  0.81    Ca    10.2      04 May 2020 06:33         PHYSICAL EXAM:  Gen: NAD  Skin: No rashes, bruises, or lesions  Head: Normocephalic, Atraumatic  Face: ecchymosis b/l nasolabial folds. no edema, erythema, or fluctuance. Parotid glands soft without mass  Eyes: no scleral injection  Ears: Right - minimal cerumen, able to visualize TM, TM intact without effusion or erythema. No evidence of any fluid drainage. No mastoid tenderness, erythema, or ear bulging            Left - minimal cerumen, able to visualize TM. ear canal clear, TM intact without effusion or erythema. No evidence of any fluid drainage. No mastoid tenderness, erythema, or ear bulging  Nose: Nares bilaterally patent, no discharge  Mouth: No Stridor / Drooling / Trismus.  Mucosa moist, tongue/uvula midline, oropharynx clear  Neck: Flat, supple, no lymphadenopathy, trachea midline, no masses  Lymphatic: No lymphadenopathy  Resp: breathing easily, no stridor  CV: no peripheral edema/cyanosis  GI: nondistended   Peripheral vascular: no JVD or edema  Neuro: facial nerve intact, no facial droop

## 2020-05-05 NOTE — PHYSICAL THERAPY INITIAL EVALUATION ADULT - PERTINENT HX OF CURRENT PROBLEM, REHAB EVAL
80 female, Hx: HTN (Atenolol); with recent syncopal workup 1 month prior for a previous fall. now Presents 3 days after a fall on her face, reports she was feeling dizzy prior to falling, have trouble pronouncing words/findings words.  CONT BELOW

## 2020-05-05 NOTE — PHYSICAL THERAPY INITIAL EVALUATION ADULT - CRITERIA FOR SKILLED THERAPEUTIC INTERVENTIONS
impairments found/rehab potential/functional limitations in following categories impairments found/functional limitations in following categories/anticipated discharge recommendation/rehab potential

## 2020-05-05 NOTE — PROGRESS NOTE ADULT - ASSESSMENT
79F        hx HTN, right hip replacement, h/o falls,    on  last  visit, on  3/27/20,    ct pelvis, Left acetabular fx/ Left inf pubic  ramus  fx, per  ortho, no  intervention  HTN, on atenolol/ c/c  anemia,  elevated  lfts/  ct  abdomen in 2019,  normal  liver  COVID  19.. negative, on  3/ 27  and  on  3/31      now  admitted  with  h/o  dizziness ,  and  a  fall,  3  days  prior to  admission  ha s jamir orbital  ecchymoses  h/o  unsteady  gait   ct  head,  no    bleed    cxr,  normal    x ray   knee/  pelvis, no  fx    orthostatics ,  negative  mild  hyponatremia  and   mild  hypercalcemia, on iv  fluids. has  resolved  PT  eval pending/  await  disposition  and   bp  meds, per card  echo,  normal  ef  PT eval     from: CT Chest No Cont (03.31.20 @ 16:35) >  IMPRESSION:  Clear lungs. No consolidations, edema, effusion or pneumothorax.  Incidental findings as above  < end of copied text >     < from: CT 3D Reconstruct w/ Workstation (03.27.20 @ 00:32) >  es are noted in the anterior right hip.  IMPRESSION:  1. Acute, nondisplaced fracture of the anterior left acetabulum.  2. Acute, nondisplaced of the left inferior pubic ramus.  < end of copied text >     < from: Xray Knee 3 Views, Left (03.26.20 @ 22:24) >  IMPRESSION:  There is a lucency at the anterolateral aspect of the tibia best seen on the frontal x-ray of the left knee. Evaluation is limited secondary to overlying material, however this may represent a fracture. Correlate with point tenderness.. CT of the knee can be obtained if clinically indicated  < end of copied text >

## 2020-05-05 NOTE — DISCHARGE NOTE PROVIDER - NSDCCPCAREPLAN_GEN_ALL_CORE_FT
PRINCIPAL DISCHARGE DIAGNOSIS  Diagnosis: Dizziness  Assessment and Plan of Treatment: c/w Mecalazine PRINCIPAL DISCHARGE DIAGNOSIS  Diagnosis: Dizziness  Assessment and Plan of Treatment: Work up negative. CT head negative.  Continue medications as prescribed        SECONDARY DISCHARGE DIAGNOSES  Diagnosis: Falls  Assessment and Plan of Treatment: Xrays negative. No fractures.   Fall precautions    Diagnosis: Hyponatremia  Assessment and Plan of Treatment: Resolved with IV fluid PRINCIPAL DISCHARGE DIAGNOSIS  Diagnosis: Dizziness  Assessment and Plan of Treatment: Work up negative. CT head negative.  Continue medications as prescribed        SECONDARY DISCHARGE DIAGNOSES  Diagnosis: Falls  Assessment and Plan of Treatment: Xrays negative. No fractures.   Fall precautions    Diagnosis: UTI (urinary tract infection), uncomplicated  Assessment and Plan of Treatment: if you were prescribed antibiotics, take them exactly as your caregiver instructs you. Finish the medication even if you feel better after you have only taken some of the medication.  Drink enough water and fluids to keep your urine clear or pale yellow.  Avoid caffeine, tea, and carbonated beverages. They tend to irritate your bladder.  Empty your bladder often. Avoid holding urine for long periods of time.  After a bowel movement, women should cleanse from front to back. Use each tissue only once.  SEEK MEDICAL CARE IF:  You have back pain.  You develop a fever.  Your symptoms do not begin to resolve within 3 days.  SEEK IMMEDIATE MEDICAL CARE IF:  You have severe back pain or lower abdominal pain.  You develop chills.  You have nausea or vomiting.  You have continued burning or discomfort with urination.    Diagnosis: Hyponatremia  Assessment and Plan of Treatment: Resolved with IV fluid

## 2020-05-05 NOTE — CONSULT NOTE ADULT - PROBLEM SELECTOR RECOMMENDATION 9
- Recommend Antivert  - Debrox 4 drops BID x1 week for cerumen   - No further ENT intervention at this time  - Call with questions or concerns

## 2020-05-05 NOTE — PROGRESS NOTE ADULT - SUBJECTIVE AND OBJECTIVE BOX
CARDIOLOGY     PROGRESS  NOTE   ________________________________________________    CHIEF Complaint: patient is a 80y old  Female who presents with a chief complaint of s/p fall (04 May 2020 10:12)   complain of dizziness.  	  REVIEW OF SYSTEMS:  CONSTITUTIONAL: No fever, weight loss, or fatigue  EYES: No eye pain, visual disturbances, or discharge  ENT:  No difficulty hearing, tinnitus, vertigo; No sinus or throat pain  NECK: No pain or stiffne  RESPIRATORY: No cough, wheezing, chills or hemoptysis; No Shortness of Breath  CARDIOVASCULAR: No chest pain, palpitations, passing out, dizziness, or leg swelling  GASTROINTESTINAL: No abdominal or epigastric pain. No nausea, vomiting, or hematemesis; No diarrhea or constipation. No melena or hematochezia.  GENITOURINARY: No dysuria, frequency, hematuria, or incontinence  NEUROLOGICAL: No headaches, memory loss, loss of strength, numbness, or tremors  SKIN: No itching, burning, rashes, or lesions   LYMPH Nodes: No enlarged glands  ENDOCRINE: No heat or cold intolerance; No hair loss  MUSCULOSKELETAL: No joint pain or swelling; No muscle, back, or extremity pain  PSYCHIATRIC: No depression, anxiety, mood swings, or difficulty sleeping  HEME/LYMPH: No easy bruising, or bleeding gums  ALLERGY AND IMMUNOLOGIC: No hives or eczema	    [ ] All others negative	  [ ] Unable to obtain    PHYSICAL EXAM:  T(C): 37.1 (05-04-20 @ 13:10), Max: 37.1 (05-04-20 @ 13:10)  HR: 67 (05-04-20 @ 15:50) (67 - 73)  BP: 157/80 (05-04-20 @ 15:50) (157/80 - 178/82)  RR: 16 (05-04-20 @ 15:50) (16 - 18)  SpO2: 99% (05-04-20 @ 15:50) (97% - 99%)  Wt(kg): --  I&O's Summary    04 May 2020 07:01  -  05 May 2020 07:00  --------------------------------------------------------  IN: 240 mL / OUT: 40 mL / NET: 200 mL        Appearance: Normal	  HEENT:   Normal oral mucosa, PERRL, EOMI	  Lymphatic: No lymphadenopathy  Cardiovascular: Normal S1 S2, No JVD,+ murmurs, No edema  Respiratory: Lungs clear to auscultation	  Psychiatry: A & O x 3, Mood & affect appropriate  Gastrointestinal:  Soft, Non-tender, + BS	  Skin: No rashes, No ecchymoses, No cyanosis	  Neurologic: Non-focal  Extremities: Normal range of motion, No clubbing, cyanosis or edema  Vascular: Peripheral pulses palpable 2+ bilaterally    MEDICATIONS  (STANDING):  ATENolol  Tablet 25 milliGRAM(s) Oral daily  enoxaparin Injectable 40 milliGRAM(s) SubCutaneous daily  meclizine 12.5 milliGRAM(s) Oral two times a day  melatonin 3 milliGRAM(s) Oral once  senna 2 Tablet(s) Oral at bedtime      TELEMETRY: 	    ECG:  	  RADIOLOGY:  OTHER: 	  	  LABS:	 	    CARDIAC MARKERS:                                12.3   8.45  )-----------( 238      ( 04 May 2020 06:33 )             38.2     05-04    137  |  101  |  13  ----------------------------<  97  3.9   |  21<L>  |  0.81    Ca    10.2      04 May 2020 06:33    TPro  7.7  /  Alb  4.3  /  TBili  1.2  /  DBili  x   /  AST  66<H>  /  ALT  67<H>  /  AlkPhos  82  05-03    proBNP:   Lipid Profile:   HgA1c:   TSH:   < from: Transthoracic Echocardiogram (05.04.20 @ 14:31) >  Mitral Valve: Normal mitral valve. Minimal mitral  regurgitation.  Aortic Valve/Aorta: Mildly calcified aortic valve with  normal opening.  Normal aortic root size.  Left Atrium: Normal left atrium.  Left Ventricle: Normal left ventricular internal dimensions  and wall thicknesses.  Normal left ventricular systolic function. No segmental  wall motion abnormalities.  Normal diastolic function.  Right Heart: Normal right atrium. Normal right ventricular  size and function. Normal tricuspid valve. Minimal  tricuspid regurgitation. Normal pulmonic valve. Minimal  pulmonic regurgitation.  Pericardium/Pleura: Normal pericardium with no pericardial  effusion.  Hemodynamic: Estimated right atrial pressure is normal.  No evidence of pulmonary hypertension.  No PFO seen with color Doppler.  ------------------------------------------------------------------------  Conclusions:  Normal left ventricular systolic function. No segmental  wall motion abnormalities.    < end of copied text >  < from: CT Head No Cont (05.03.20 @ 16:06) >  Straightening of the cervical lordosis due to muscle spasm and/or positioning. There is no prevertebral soft tissue swelling. Visualized airways are patent. Partially imaged lung apices are clear.     Vertebral body heights and alignment are maintained without compression deformity. The atlantodental and atlanto-occipital joints are maintained. Multilevel moderate degenerative changes presented as intervertebral disc space narrowing, subchondral and facet joint sclerosis, ligamentum flavum hypertrophy and marginal vertebral body osteophyte formation.    Thyroid gland is heterogeneous with calcifications.    IMPRESSION:     CT BRAIN: N    < end of copied text >        Assessment and plan  ---------------------------  pt is well known to me from the last admission.  80 female, Hx: HTN (Atenolol); with recent syncopal workup 1 month prior for a previous fall . Presents 3 days after a fall on her face, reports she was feeling dizzy prior to falling, have trouble pronouncing words/findings words. Denies any preceding CP, SOB, nausea, vomiting, diarrhea, constipation, bloody stools, or dysuric symptoms. Today she presents with poorly localized abdominal pain and nausea X1 day.,  since  resolved  Denies any recent fevers; denies prior hx of CVA or MI, PE or DVT.   pt with multiple syncopal episode since last admission   last admission HCTZ  was dcd  pt with known hx of cad, non orthostatic on exam  echo noted  physical therapy, instability on her gait  dvt prophylaxis  asa daily  will add lisinopril if increase bp  dizziness ?orthostatic, ct head noted ?adding meclizine

## 2020-05-05 NOTE — DISCHARGE NOTE PROVIDER - PROVIDER TOKENS
FREE:[LAST:[Dariana],FIRST:[Jovani],PHONE:[phone]],FAX:[(   )    -]] PROVIDER:[TOKEN:[[id number]]]

## 2020-05-05 NOTE — DISCHARGE NOTE PROVIDER - NSDCMRMEDTOKEN_GEN_ALL_CORE_FT
acetaminophen 325 mg oral tablet: 2 tab(s) orally every 8 hours, As needed, Mild Pain (1 - 3)  atenolol 25 mg oral tablet: 1 tab(s) orally once a day atenolol 25 mg oral tablet: 1 tab(s) orally once a day  carbamide peroxide 6.5% otic solution: 4 drop(s) to each affected ear 2 times a day  cephalexin 250 mg oral capsule: 1 cap(s) orally every 12 hours until 5/9 am dose  lisinopril 5 mg oral tablet: 1 tab(s) orally 2 times a day  meclizine 12.5 mg oral tablet: 1 tab(s) orally 2 times a day x 3 days  senna oral tablet: 2 tab(s) orally once a day (at bedtime)

## 2020-05-05 NOTE — DISCHARGE NOTE PROVIDER - CARE PROVIDER_API CALL
Jovani Westbrook  Phone: [phone]  Fax: (   )    -  Follow Up Time: Jovani Harris)  Cardiovascular Disease; Internal Medicine  [facility] [place] [id number]  Phone: [phone]  Fax: [phone]  Follow Up Time:

## 2020-05-06 LAB
ANION GAP SERPL CALC-SCNC: 13 MMOL/L — SIGNIFICANT CHANGE UP (ref 5–17)
BUN SERPL-MCNC: 14 MG/DL — SIGNIFICANT CHANGE UP (ref 7–23)
CALCIUM SERPL-MCNC: 10.7 MG/DL — HIGH (ref 8.4–10.5)
CHLORIDE SERPL-SCNC: 100 MMOL/L — SIGNIFICANT CHANGE UP (ref 96–108)
CO2 SERPL-SCNC: 21 MMOL/L — LOW (ref 22–31)
CREAT SERPL-MCNC: 0.85 MG/DL — SIGNIFICANT CHANGE UP (ref 0.5–1.3)
GLUCOSE SERPL-MCNC: 123 MG/DL — HIGH (ref 70–99)
POTASSIUM SERPL-MCNC: 4 MMOL/L — SIGNIFICANT CHANGE UP (ref 3.5–5.3)
POTASSIUM SERPL-SCNC: 4 MMOL/L — SIGNIFICANT CHANGE UP (ref 3.5–5.3)
SODIUM SERPL-SCNC: 134 MMOL/L — LOW (ref 135–145)

## 2020-05-06 PROCEDURE — 99232 SBSQ HOSP IP/OBS MODERATE 35: CPT

## 2020-05-06 RX ORDER — LISINOPRIL 2.5 MG/1
5 TABLET ORAL
Refills: 0 | Status: DISCONTINUED | OUTPATIENT
Start: 2020-05-06 | End: 2020-05-07

## 2020-05-06 RX ORDER — SODIUM CHLORIDE 9 MG/ML
1000 INJECTION INTRAMUSCULAR; INTRAVENOUS; SUBCUTANEOUS
Refills: 0 | Status: DISCONTINUED | OUTPATIENT
Start: 2020-05-06 | End: 2020-05-07

## 2020-05-06 RX ORDER — CEPHALEXIN 500 MG
250 CAPSULE ORAL EVERY 12 HOURS
Refills: 0 | Status: DISCONTINUED | OUTPATIENT
Start: 2020-05-06 | End: 2020-05-07

## 2020-05-06 RX ADMIN — SODIUM CHLORIDE 50 MILLILITER(S): 9 INJECTION INTRAMUSCULAR; INTRAVENOUS; SUBCUTANEOUS at 11:26

## 2020-05-06 RX ADMIN — LISINOPRIL 5 MILLIGRAM(S): 2.5 TABLET ORAL at 05:05

## 2020-05-06 RX ADMIN — Medication 250 MILLIGRAM(S): at 17:32

## 2020-05-06 RX ADMIN — ATENOLOL 25 MILLIGRAM(S): 25 TABLET ORAL at 05:05

## 2020-05-06 RX ADMIN — SODIUM CHLORIDE 50 MILLILITER(S): 9 INJECTION INTRAMUSCULAR; INTRAVENOUS; SUBCUTANEOUS at 21:57

## 2020-05-06 RX ADMIN — LISINOPRIL 5 MILLIGRAM(S): 2.5 TABLET ORAL at 17:32

## 2020-05-06 RX ADMIN — ENOXAPARIN SODIUM 40 MILLIGRAM(S): 100 INJECTION SUBCUTANEOUS at 11:25

## 2020-05-06 NOTE — DIETITIAN INITIAL EVALUATION ADULT. - OTHER INFO
Pt reports good appetite and PO intake at home. Reports not following any type of diet or restriction, however states avoiding acidic foods for acid reflux. Confirms NKFA. Reports taking Vitamin D, Vitamin B6, and Vitamin B12 PTA; reports drinking Ensure Original 2xday.     Pt reports good appetite and PO intake. Noted 50% PO intake as per flow sheets. Offered nutritional supplement, pt agreed to Ensure Enlive 1xday. Reports difficulty chewing due to leaving dentures at home - requests soft diet; denies difficulty swallowing. Denies nausea, vomiting, diarrhea, and constipation, reports last BM yesterday (05/05).     Pt denies weight  changes PTA, states clothes fit her the same way, reports UBW approximately 147-148 pounds. Weight as per previous RD note (04/01/2020) 146.6 pounds. Weight as per flow sheets (05/03) 110 pounds -?accuracy; obtained bedscale weight (05/06) 148 pounds - will continue to monitor.     Pt denies having questions/concerns about diet and nutrition at this time, refused education - made aware RD remains available.

## 2020-05-06 NOTE — DIETITIAN INITIAL EVALUATION ADULT. - PHYSICAL APPEARANCE
No visual signs of muscle/fat loss noted/overweight/other (specify) Based on height and weight stated per pt:   Ht: 61 inches Wt: 147 pounds BMI: 27.7 kg/m2 IBW: 105 (+/-10%) 140.0 %IBW  No noted edema as per flow sheets.   Skin: no noted pressure injuries as per documentation.

## 2020-05-06 NOTE — PROGRESS NOTE ADULT - SUBJECTIVE AND OBJECTIVE BOX
CARDIOLOGY     PROGRESS  NOTE   ________________________________________________    CHIEF COMPLAINT:Patient is a 80y old  Female who presents with a chief complaint of s/p fall (05 May 2020 18:27)  no complain.  	  REVIEW OF SYSTEMS:  CONSTITUTIONAL: No fever, weight loss, or fatigue  EYES: No eye pain, visual disturbances, or discharge  ENT:  No difficulty hearing, tinnitus, vertigo; No sinus or throat pain  NECK: No pain or stiffness  RESPIRATORY: No cough, wheezing, chills or hemoptysis; +  Shortness of Breath  CARDIOVASCULAR: No chest pain, palpitations, passing out, dizziness, or leg swelling  GASTROINTESTINAL: No abdominal or epigastric pain. No nausea, vomiting, or hematemesis; No diarrhea or constipation. No melena or hematochezia.  GENITOURINARY: No dysuria, frequency, hematuria, or incontinence  NEUROLOGICAL: No headaches, memory loss, loss of strength, numbness, or tremors  SKIN: No itching, burning, rashes, or lesions   LYMPH Nodes: No enlarged glands  ENDOCRINE: No heat or cold intolerance; No hair loss  MUSCULOSKELETAL: No joint pain or swelling; No muscle, back, or extremity pain  PSYCHIATRIC: No depression, anxiety, mood swings, or difficulty sleeping  HEME/LYMPH: No easy bruising, or bleeding gums  ALLERGY AND IMMUNOLOGIC: No hives or eczema	    [ ] All others negative	  [ ] Unable to obtain    PHYSICAL EXAM:  T(C): 36.9 (05-06-20 @ 04:49), Max: 36.9 (05-06-20 @ 04:49)  HR: 69 (05-06-20 @ 04:49) (66 - 72)  BP: 160/78 (05-06-20 @ 04:49) (158/70 - 173/77)  RR: 18 (05-06-20 @ 04:49) (17 - 18)  SpO2: 95% (05-06-20 @ 04:49) (95% - 99%)  Wt(kg): --  I&O's Summary    05 May 2020 07:01  -  06 May 2020 07:00  --------------------------------------------------------  IN: 150 mL / OUT: 700 mL / NET: -550 mL        Appearance: Normal	  HEENT:   Normal oral mucosa, PERRL, EOMI	  Lymphatic: No lymphadenopathy  Cardiovascular: Normal S1 S2, No JVD, + murmurs, No edema  Respiratory: Lungs clear to auscultation	  Psychiatry: A & O x 3, Mood & affect appropriate  Gastrointestinal:  Soft, Non-tender, + BS	  Skin: No rashes, No ecchymoses, No cyanosis	  Neurologic: Non-focal  Extremities: Normal range of motion, No clubbing, cyanosis or edema  Vascular: Peripheral pulses palpable 2+ bilaterally    MEDICATIONS  (STANDING):  ATENolol  Tablet 25 milliGRAM(s) Oral daily  carbamide peroxide Otic Solution 4 Drop(s) Both Ears two times a day  enoxaparin Injectable 40 milliGRAM(s) SubCutaneous daily  lisinopril 5 milliGRAM(s) Oral daily  meclizine 12.5 milliGRAM(s) Oral two times a day  melatonin 3 milliGRAM(s) Oral once  senna 2 Tablet(s) Oral at bedtime      TELEMETRY: 	    ECG:  	  RADIOLOGY:  OTHER: 	  	  LABS:	 	    CARDIAC MARKERS:            05-06    134<L>  |  100  |  14  ----------------------------<  123<H>  4.0   |  21<L>  |  0.85    Ca    10.7<H>      06 May 2020 06:40      proBNP:   Lipid Profile:   HgA1c:   TSH:         Assessment and plan  ---------------------------  pt is well known to me from the last admission.  80 female, Hx: HTN (Atenolol); with recent syncopal workup 1 month prior for a previous fall . Presents 3 days after a fall on her face, reports she was feeling dizzy prior to falling, have trouble pronouncing words/findings words. Denies any preceding CP, SOB, nausea, vomiting, diarrhea, constipation, bloody stools, or dysuric symptoms. Today she presents with poorly localized abdominal pain and nausea X1 day.,  since  resolved  Denies any recent fevers; denies prior hx of CVA or MI, PE or DVT.   pt with multiple syncopal episode since last admission   last admission HCTZ  was dcd  pt with known hx of cad, non orthostatic on exam  echo noted  physical therapy, instability on her gait  dvt prophylaxis  asa daily  increase Lisinopril to bid  dizziness ?orthostatic, ct head noted ?adding meclizine

## 2020-05-06 NOTE — DIETITIAN INITIAL EVALUATION ADULT. - ENERGY NEEDS
Pertinent information as per chart: Pt 79 y/o F with PMH: HTN, right hip replacement, falls, admitted S/P fall, physical therapy - instability on her gait.

## 2020-05-06 NOTE — DIETITIAN INITIAL EVALUATION ADULT. - DIET TYPE
1. Recommend change to soft diet. Will continue to monitor and adjust as needed. 2. Recommend Ensure Enlive 1x daily (350 nancy and 20 gm protein) to optimize kcal and protein intake. Discussed diet and supplement with NP Nury Ornelas, pending verification ordered.

## 2020-05-06 NOTE — DIETITIAN INITIAL EVALUATION ADULT. - ADD RECOMMEND
1. Will continue to monitor PO intake, weight, labs, skin, GI status, diet. 2. Encourage PO intake and obtain food preferences (obtained at this time).

## 2020-05-06 NOTE — PROGRESS NOTE ADULT - SUBJECTIVE AND OBJECTIVE BOX
no  cp/sob    REVIEW OF SYSTEMS:  GEN: no fever,    no chills  RESP: no SOB,   no cough  CVS: no chest pain,   no palpitations  GI: no abdominal pain,   no nausea,   no vomiting,   no constipation,   no diarrhea  : no dysuria,   no frequency  NEURO: no headache,   no dizziness  PSYCH: no depression,   not anxious  Derm : no rash    MEDICATIONS  (STANDING):  ATENolol  Tablet 25 milliGRAM(s) Oral daily  carbamide peroxide Otic Solution 4 Drop(s) Both Ears two times a day  enoxaparin Injectable 40 milliGRAM(s) SubCutaneous daily  lisinopril 5 milliGRAM(s) Oral two times a day  meclizine 12.5 milliGRAM(s) Oral two times a day  melatonin 3 milliGRAM(s) Oral once  senna 2 Tablet(s) Oral at bedtime  sodium chloride 0.9%. 1000 milliLiter(s) (50 mL/Hr) IV Continuous <Continuous>    MEDICATIONS  (PRN):      Vital Signs Last 24 Hrs  T(C): 36.9 (06 May 2020 04:49), Max: 36.9 (06 May 2020 04:49)  T(F): 98.4 (06 May 2020 04:49), Max: 98.4 (06 May 2020 04:49)  HR: 69 (06 May 2020 04:49) (66 - 72)  BP: 160/78 (06 May 2020 04:49) (158/70 - 173/77)  BP(mean): --  RR: 18 (06 May 2020 04:49) (17 - 18)  SpO2: 95% (06 May 2020 04:49) (95% - 99%)  CAPILLARY BLOOD GLUCOSE        I&O's Summary    05 May 2020 07:01  -  06 May 2020 07:00  --------------------------------------------------------  IN: 150 mL / OUT: 700 mL / NET: -550 mL        PHYSICAL EXAM:  HEAD:  Atraumatic, Normocephalic  NECK: Supple, No   JVD  CHEST/LUNG:   no     rales,     no,    rhonchi  HEART: Regular rate and rhythm;         murmur  ABDOMEN: Soft, Nontender, ;   EXTREMITIES:    no    edema  NEUROLOGY:  alert    LABS:    05-06    134<L>  |  100  |  14  ----------------------------<  123<H>  4.0   |  21<L>  |  0.85    Ca    10.7<H>      06 May 2020 06:40                              Consultant(s) Notes Reviewed:      Care Discussed with Consultants/Other Providers:

## 2020-05-06 NOTE — DIETITIAN INITIAL EVALUATION ADULT. - REASON INDICATOR FOR ASSESSMENT
Pt seen for BMI <19 referral.  Information obtained from: medical record, previous RD note (pt known to me from previous admission), and pt.

## 2020-05-06 NOTE — PROGRESS NOTE ADULT - ASSESSMENT
79F        hx HTN, right hip replacement, h/o falls,    on  last  visit, on  3/27/20,    ct pelvis, Left acetabular fx/ Left inf pubic  ramus  fx, per  ortho, no  intervention  HTN, on atenolol/ c/c  anemia,  elevated  lfts/  ct  abdomen in 2019,  normal  liver  COVID  19.. negative, on  3/ 27  and  on  3/31      now  admitted  with  h/o  dizziness ,  and  a  fall,  3  days  prior to  admission  ha s jamir orbital  ecchymoses  h/o  unsteady  gait   ct  head,  no    bleed, cxr,  normal/   x ray   knee/  pelvis, no  fx    orthostatics ,  negative  mild  hyponatremia  and   mild  hypercalcemia, on iv  fluids.  ,now  PT  eval ,  await  disposition  and   bp  meds, per card  echo,  normal  ef.  seen  by card  spoke  with .,   yesterday, he  wanted  pt home,  but,   today  ,he  wants  her in " rehab with extra  PT"  he  will  speak  with care cortn/  he  wants  a guarantee, that   pt will never fall  seen by  ent,  minimal  cerumen      < from: CT Head No Cont (05.03.20 @ 16:06) >  IMPRESSION:   CT BRAIN: No acute intracranial bleeding, mass effect, or shift.   CT CERVICAL SPINE: No fracture or subluxation. Straightened lordosis. Multilevel degenerative changes as described above.  < end of copied text >     from: CT Chest No Cont (03.31.20 @ 16:35) >  IMPRESSION:  Clear lungs. No consolidations, edema, effusion or pneumothorax.  Incidental findings as above  < end of copied text >     < from: CT 3D Reconstruct w/ Workstation (03.27.20 @ 00:32) >  es are noted in the anterior right hip.  IMPRESSION:  1. Acute, nondisplaced fracture of the anterior left acetabulum.  2. Acute, nondisplaced of the left inferior pubic ramus.  < end of copied text >     < from: Xray Knee 3 Views, Left (03.26.20 @ 22:24) >  IMPRESSION:  There is a lucency at the anterolateral aspect of the tibia best seen on the frontal x-ray of the left knee. Evaluation is limited secondary to overlying material, however this may represent a fracture. Correlate with point tenderness.. CT of the knee can be obtained if clinically indicated  < end of copied text > 79F        hx HTN, right hip replacement, h/o falls,    on  last  visit, on  3/27/20,    ct pelvis, Left acetabular fx/ Left inf pubic  ramus  fx, per  ortho, no  intervention  HTN, on atenolol/ c/c  anemia,  elevated  lfts/  ct  abdomen in 2019,  normal  liver  COVID  19.. negative, on  3/ 27  and  on  3/31      now  admitted  with  h/o  dizziness ,  and  a  fall,  3  days  prior to  admission  ha s jamir orbital  ecchymoses  h/o  unsteady  gait and   falls/   able  to  raise  b/l  arms/  legs  and  normal  speech/ pt usually yells  at staff   ct  head,  no    bleed, cxr,  normal/   x ray   knee/  pelvis, no  fx    orthostatics ,  negative  mild  hyponatremia  and   mild  hypercalcemia, on iv  fluids.  ,now  PT  eval ,  await  disposition  and   bp  meds, per card  echo,  normal  ef.  seen  by card  on 5/5,  called    pt  relations.  complaining about  ent  to  remove  ear  wax  spoke  with .,   yesterday, he  wanted  pt home,  but,   today  ,he  wants  her in " rehab with extra  PT"  he  will  speak  with care cortn/  he  wants  a guarantee, that   pt will never fall  per  PT, pt with decreased  strength/ poor  balance  and  poor  balance  control  seen by  ent,  minimal  cerumen  follow  bmp in am, if  stable, then will  clear  for   d/c  to  rehab      < from: CT Head No Cont (05.03.20 @ 16:06) >  IMPRESSION:   CT BRAIN: No acute intracranial bleeding, mass effect, or shift.   CT CERVICAL SPINE: No fracture or subluxation. Straightened lordosis. Multilevel degenerative changes as described above.  < end of copied text >     from: CT Chest No Cont (03.31.20 @ 16:35) >  IMPRESSION:  Clear lungs. No consolidations, edema, effusion or pneumothorax.  Incidental findings as above  < end of copied text >     < from: CT 3D Reconstruct w/ Workstation (03.27.20 @ 00:32) >  es are noted in the anterior right hip.  IMPRESSION:  1. Acute, nondisplaced fracture of the anterior left acetabulum.  2. Acute, nondisplaced of the left inferior pubic ramus.  < end of copied text >     < from: Xray Knee 3 Views, Left (03.26.20 @ 22:24) >  IMPRESSION:  There is a lucency at the anterolateral aspect of the tibia best seen on the frontal x-ray of the left knee. Evaluation is limited secondary to overlying material, however this may represent a fracture. Correlate with point tenderness.. CT of the knee can be obtained if clinically indicated  < end of copied text > 79F        hx HTN, right hip replacement, h/o falls, OA  , spine  BMI  is  18, sarcopenia   on  last  visit, on  3/27/20,    ct pelvis, Left acetabular fx/ Left inf pubic  ramus  fx, per  ortho, no  intervention  HTN, on atenolol/ c/c  anemia,  elevated  lfts/  ct  abdomen in 2019,  normal  liver  COVID  19.. negative, on  3/ 27  and  on  3/31      now  admitted  with  h/o  dizziness ,  and  a  fall,  3  days  prior to  admission  haD  jamir orbital  ecchymoses  CT  head,  no bleed/  stroke  h/o  unsteady  gait and   falls/  pt  with poor  balance/  sarcopenia  and  OA    able  to  raise  b/l  arms/  legs  and  normal  speech/ pt usually yells  at staff   ct  head,  no    bleed,    cxr,  normal/    x ray ,    knee/  pelvis, no  fx    orthostatics ,  negative  mild  hyponatremia  and   mild  hypercalcemia, on iv  fluids.  ,now  PT  eval ,  await  disposition  and   bp  meds, per card  echo,  normal  ef.  seen  by card  uti  with proteus, on  keflex fo  3  days/ pt has no dysuria/  allergic  to  cipro  on 5/5,  called    pt  relations.  complaining /for   ent  to  remove  ear  wax/ as  he thinks , reason for  rptd  falls  spoke  with .,   yesterday, he  wanted  pt home,  but,   today  ,he  wants  her in " rehab with extra  PT"  he  will  speak  with care cortn/  he  wants  a guarantee, that   pt will never fall  per  PT, pt with decreased  strength/ poor  balance  and  poor  balance  control  seen by  ent,  minimal  cerumen  follow  bmp in am, if  stable, then will  clear  for   d/c  to  rehab      < from: CT Head No Cont (05.03.20 @ 16:06) >  IMPRESSION:   CT BRAIN: No acute intracranial bleeding, mass effect, or shift.   CT CERVICAL SPINE: No fracture or subluxation. Straightened lordosis. Multilevel degenerative changes as described above.  < end of copied text >     from: CT Chest No Cont (03.31.20 @ 16:35) >  IMPRESSION:  Clear lungs. No consolidations, edema, effusion or pneumothorax.  Incidental findings as above  < end of copied text >     < from: CT 3D Reconstruct w/ Workstation (03.27.20 @ 00:32) >  es are noted in the anterior right hip.  IMPRESSION:  1. Acute, nondisplaced fracture of the anterior left acetabulum.  2. Acute, nondisplaced of the left inferior pubic ramus.  < end of copied text >     < from: Xray Knee 3 Views, Left (03.26.20 @ 22:24) >  IMPRESSION:  There is a lucency at the anterolateral aspect of the tibia best seen on the frontal x-ray of the left knee. Evaluation is limited secondary to overlying material, however this may represent a fracture. Correlate with point tenderness.. CT of the knee can be obtained if clinically indicated  < end of copied text > 79F        hx HTN, right hip replacement, h/o falls, OA  , spine  BMI  is  18, sarcopenia   on  last  visit, on  3/27/20,    ct pelvis, Left acetabular fx/ Left inf pubic  ramus  fx, per  ortho, no  intervention  HTN, on atenolol/ c/c  anemia,  elevated  lfts/  ct  abdomen in 2019,  normal  liver  COVID  19.. negative, on  3/ 27  and  on  3/31      now  admitted  with  h/o  dizziness ,  and  a  fall,  3  days  prior to  admission  haD  jamir orbital  ecchymoses  CT  head,  no bleed/  stroke  h/o  unsteady  gait and   falls/  pt  with poor  balance/  sarcopenia  and  OA    able  to  raise  b/l  arms/  legs  and  normal  speech/ pt usually yells  at staff   ct  head,  no    bleed,    cxr,  normal/    x ray ,    knee/  pelvis, no  fx    orthostatics ,  negative  mild  hyponatremia  and   mild  hypercalcemia, on iv  fluids.  ,now  PT  eval ,  await  disposition  and   bp  meds, per card  echo,  normal  ef.  seen  by card  uti  with proteus, on  keflex fo  3  days/ pt has no dysuria/  allergic  to  cipro  on 5/5,  called    pt  relations.  complaining /for   ent  to  remove  ear  wax/ as  he thinks , reason for  rptd  falls  spoke  with .,   yesterday, he  wanted  pt home,  but,   today  ,he  wants  her in " rehab with extra  PT"  he  will  speak  with care karenn/  he  wants  a guarantee, that   pt will never fall  per  PT, pt with decreased  strength/ poor  balance  and  poor  balance  control  seen by  ent,  minimal  cerumen  follow  bmp in am, if  stable, then will  clear  for   d/c  to  rehab  per  rn, that  pt  has   been refusing  some  of  her meds/    addendum   spoke with  pts  pmd, dr susan townsend,/ agrees   with  plan  of  care/ had  called pmd.    now    is  demanding a  speech  therapist,  for  a  h/o  chronic  mild  speech  impediment, that  he  states, only   he  has  noted, / pt is  edentulous    pt has  left  her  dentures  at  home,  which can  affect  her  speech  this  am, pt   was  able  to  clearly  states  her  wishes, in presence  of  her nurse      < from: CT Head No Cont (05.03.20 @ 16:06) >  IMPRESSION:   CT BRAIN: No acute intracranial bleeding, mass effect, or shift.   CT CERVICAL SPINE: No fracture or subluxation. Straightened lordosis. Multilevel degenerative changes as described above.  < end of copied text >     from: CT Chest No Cont (03.31.20 @ 16:35) >  IMPRESSION:  Clear lungs. No consolidations, edema, effusion or pneumothorax.  Incidental findings as above  < end of copied text >     < from: CT 3D Reconstruct w/ Workstation (03.27.20 @ 00:32) >  es are noted in the anterior right hip.  IMPRESSION:  1. Acute, nondisplaced fracture of the anterior left acetabulum.  2. Acute, nondisplaced of the left inferior pubic ramus.  < end of copied text >     < from: Xray Knee 3 Views, Left (03.26.20 @ 22:24) >  IMPRESSION:  There is a lucency at the anterolateral aspect of the tibia best seen on the frontal x-ray of the left knee. Evaluation is limited secondary to overlying material, however this may represent a fracture. Correlate with point tenderness.. CT of the knee can be obtained if clinically indicated  < end of copied text >

## 2020-05-07 ENCOUNTER — TRANSCRIPTION ENCOUNTER (OUTPATIENT)
Age: 80
End: 2020-05-07

## 2020-05-07 VITALS — TEMPERATURE: 98 F

## 2020-05-07 LAB
ANION GAP SERPL CALC-SCNC: 14 MMOL/L — SIGNIFICANT CHANGE UP (ref 5–17)
BUN SERPL-MCNC: 15 MG/DL — SIGNIFICANT CHANGE UP (ref 7–23)
CALCIUM SERPL-MCNC: 10.3 MG/DL — SIGNIFICANT CHANGE UP (ref 8.4–10.5)
CHLORIDE SERPL-SCNC: 102 MMOL/L — SIGNIFICANT CHANGE UP (ref 96–108)
CO2 SERPL-SCNC: 19 MMOL/L — LOW (ref 22–31)
CREAT SERPL-MCNC: 0.83 MG/DL — SIGNIFICANT CHANGE UP (ref 0.5–1.3)
GLUCOSE SERPL-MCNC: 132 MG/DL — HIGH (ref 70–99)
POTASSIUM SERPL-MCNC: 4.1 MMOL/L — SIGNIFICANT CHANGE UP (ref 3.5–5.3)
POTASSIUM SERPL-SCNC: 4.1 MMOL/L — SIGNIFICANT CHANGE UP (ref 3.5–5.3)
SARS-COV-2 RNA SPEC QL NAA+PROBE: SIGNIFICANT CHANGE UP
SODIUM SERPL-SCNC: 135 MMOL/L — SIGNIFICANT CHANGE UP (ref 135–145)

## 2020-05-07 PROCEDURE — 72170 X-RAY EXAM OF PELVIS: CPT

## 2020-05-07 PROCEDURE — 72125 CT NECK SPINE W/O DYE: CPT

## 2020-05-07 PROCEDURE — 80053 COMPREHEN METABOLIC PANEL: CPT

## 2020-05-07 PROCEDURE — 97161 PT EVAL LOW COMPLEX 20 MIN: CPT

## 2020-05-07 PROCEDURE — 73564 X-RAY EXAM KNEE 4 OR MORE: CPT

## 2020-05-07 PROCEDURE — 99232 SBSQ HOSP IP/OBS MODERATE 35: CPT

## 2020-05-07 PROCEDURE — 70450 CT HEAD/BRAIN W/O DYE: CPT

## 2020-05-07 PROCEDURE — 93306 TTE W/DOPPLER COMPLETE: CPT

## 2020-05-07 PROCEDURE — 96374 THER/PROPH/DIAG INJ IV PUSH: CPT

## 2020-05-07 PROCEDURE — 80048 BASIC METABOLIC PNL TOTAL CA: CPT

## 2020-05-07 PROCEDURE — 87635 SARS-COV-2 COVID-19 AMP PRB: CPT

## 2020-05-07 PROCEDURE — 71045 X-RAY EXAM CHEST 1 VIEW: CPT

## 2020-05-07 PROCEDURE — 87186 SC STD MICRODIL/AGAR DIL: CPT

## 2020-05-07 PROCEDURE — 99285 EMERGENCY DEPT VISIT HI MDM: CPT | Mod: 25

## 2020-05-07 PROCEDURE — 85027 COMPLETE CBC AUTOMATED: CPT

## 2020-05-07 PROCEDURE — 87086 URINE CULTURE/COLONY COUNT: CPT

## 2020-05-07 PROCEDURE — 83690 ASSAY OF LIPASE: CPT

## 2020-05-07 PROCEDURE — 84484 ASSAY OF TROPONIN QUANT: CPT

## 2020-05-07 PROCEDURE — 92522 EVALUATE SPEECH PRODUCTION: CPT

## 2020-05-07 PROCEDURE — 81001 URINALYSIS AUTO W/SCOPE: CPT

## 2020-05-07 PROCEDURE — 97116 GAIT TRAINING THERAPY: CPT

## 2020-05-07 PROCEDURE — 97530 THERAPEUTIC ACTIVITIES: CPT

## 2020-05-07 RX ORDER — SENNA PLUS 8.6 MG/1
2 TABLET ORAL
Qty: 0 | Refills: 0 | DISCHARGE
Start: 2020-05-07

## 2020-05-07 RX ORDER — MECLIZINE HCL 12.5 MG
1 TABLET ORAL
Qty: 0 | Refills: 0 | DISCHARGE
Start: 2020-05-07

## 2020-05-07 RX ORDER — LISINOPRIL 2.5 MG/1
1 TABLET ORAL
Qty: 0 | Refills: 0 | DISCHARGE
Start: 2020-05-07

## 2020-05-07 RX ORDER — CEPHALEXIN 500 MG
1 CAPSULE ORAL
Qty: 0 | Refills: 0 | DISCHARGE
Start: 2020-05-07

## 2020-05-07 RX ORDER — CARBAMIDE PEROXIDE 81.86 MG/ML
4 SOLUTION/ DROPS AURICULAR (OTIC)
Qty: 0 | Refills: 0 | DISCHARGE
Start: 2020-05-07

## 2020-05-07 RX ORDER — ACETAMINOPHEN 500 MG
650 TABLET ORAL ONCE
Refills: 0 | Status: COMPLETED | OUTPATIENT
Start: 2020-05-07 | End: 2020-05-07

## 2020-05-07 RX ADMIN — ATENOLOL 25 MILLIGRAM(S): 25 TABLET ORAL at 05:02

## 2020-05-07 RX ADMIN — Medication 250 MILLIGRAM(S): at 05:02

## 2020-05-07 RX ADMIN — ENOXAPARIN SODIUM 40 MILLIGRAM(S): 100 INJECTION SUBCUTANEOUS at 11:40

## 2020-05-07 RX ADMIN — LISINOPRIL 5 MILLIGRAM(S): 2.5 TABLET ORAL at 05:02

## 2020-05-07 NOTE — SWALLOW BEDSIDE ASSESSMENT ADULT - SWALLOW EVAL: DIAGNOSIS
Pt is being seen for bedside speech-language evaluation per patient  request and patient complaints of reduced word finding and changes in speech production in the setting of balance difficulties/recent falls. Pt presents with imprecise articulation, noted weakness and decreased coordination between repiration/phonation suspected to present dysarthria. Speech is suspected to be further impacted by absent dentures per patient reports. Cognitive linguistic deficits noted for high distractibility, tangential conversation, as well as impaired working memory and short term memory.

## 2020-05-07 NOTE — SWALLOW BEDSIDE ASSESSMENT ADULT - SLP PERTINENT HISTORY OF CURRENT PROBLEM
Per charting, 79F PMHx R hip replacement, h/o falls, OA, spine BMI is 18, sarcopenia on last visit 3/27/20. HTN, anemia.

## 2020-05-07 NOTE — PROGRESS NOTE ADULT - ASSESSMENT
79F        hx HTN, right hip replacement, h/o falls, OA  , spine  BMI  is  18, sarcopenia   on  last  visit, on  3/27/20,    ct pelvis, Left acetabular fx/ Left inf pubic  ramus  fx, per  ortho, no  intervention  HTN, on atenolol/ c/c  anemia,  elevated  lfts/  ct  abdomen in 2019,  normal  liver  COVID  19.. negative, on  3/ 27  and  on  3/31      now  admitted  with  h/o  dizziness ,  and  a  fall,  3  days  prior to  admission  haD  jamir orbital  ecchymoses  CT  head,  no bleed/  stroke  h/o  unsteady  gait and   falls/  pt  with poor  balance/  sarcopenia  and  OA    able  to  raise  b/l  arms/  legs  and  normal  speech/ pt usually yells  at staff   ct  head,  no    bleed,    cxr,  normal/    x ray ,    knee/  pelvis, no  fx    orthostatics ,  negative  mild  hyponatremia  and   mild  hypercalcemia, on iv  fluids.  ,now  PT  eval ,  await  disposition  and   bp  meds, per card  echo,  normal  ef.  seen  by card  uti  with proteus, on  keflex fo  3  days/ pt has no dysuria/  allergic  to  cipro  on 5/5,  called    pt  relations.  complaining /for   ent  to  remove  ear  wax/ as  he thinks , reason for  rptd  falls  spoke  with .,   yesterday, he  wanted  pt home,  but,   today  ,he  wants  her in " rehab with extra  PT"  he  will  speak  with care frankie/  he  wants  a guarantee, that   pt will never fall  per  PT, pt with decreased  strength/ poor  balance  and  poor  balance  control  seen by  ent,  minimal  cerumen  follow  bmp in am, if  stable, then will  clear  for   d/c  to  rehab  per  rn, that  pt  has   been refusing  some  of  her meds/     spoke with  pts  pmd, dr susan townsend,/ agrees   with  plan  of  care/ had  called pmd.    now    is  demanding a  speech  therapist,  for  a  h/o  chronic  mild  speech  impediment, that  he  states, only   he  has  noted, / pt is  edentulous    pt has  left  her  dentures  at  home,  which can  affect  her  speech  echo, normal  ef/  cleared   for   d/c   by  card  awaiting speech eval, pe r husbands  request. despite  pt  having no  dentures  here  proceed  with  d/c   after  seen by speech therapist            < from: CT Head No Cont (05.03.20 @ 16:06) >  IMPRESSION:   CT BRAIN: No acute intracranial bleeding, mass effect, or shift.   CT CERVICAL SPINE: No fracture or subluxation. Straightened lordosis. Multilevel degenerative changes as described above.  < end of copied text >     from: CT Chest No Cont (03.31.20 @ 16:35) >  IMPRESSION:  Clear lungs. No consolidations, edema, effusion or pneumothorax.  Incidental findings as above  < end of copied text >     < from: CT 3D Reconstruct w/ Workstation (03.27.20 @ 00:32) >  es are noted in the anterior right hip.  IMPRESSION:  1. Acute, nondisplaced fracture of the anterior left acetabulum.  2. Acute, nondisplaced of the left inferior pubic ramus.  < end of copied text >     < from: Xray Knee 3 Views, Left (03.26.20 @ 22:24) >  IMPRESSION:  There is a lucency at the anterolateral aspect of the tibia best seen on the frontal x-ray of the left knee. Evaluation is limited secondary to overlying material, however this may represent a fracture. Correlate with point tenderness.. CT of the knee can be obtained if clinically indicated  < end of copied text >

## 2020-05-07 NOTE — DISCHARGE NOTE NURSING/CASE MANAGEMENT/SOCIAL WORK - PATIENT PORTAL LINK FT
You can access the FollowMyHealth Patient Portal offered by [facility] by registering at the following website: [email or web address]. By joining [facility]’s FollowMyHealth portal, you will also be able to view your health information using other applications (apps) compatible with our system.

## 2020-05-07 NOTE — PROGRESS NOTE ADULT - SUBJECTIVE AND OBJECTIVE BOX
CARDIOLOGY     PROGRESS  NOTE   ________________________________________________    CHIEF COMPLAINT:Patient is a 80y old  Female who presents with a chief complaint of s/p fall (06 May 2020 09:33)  no complain.  	  REVIEW OF SYSTEMS:  CONSTITUTIONAL: No fever, weight loss, or fatigue  EYES: No eye pain, visual disturbances, or discharge  ENT:  No difficulty hearing, tinnitus, vertigo; No sinus or throat pain  NECK: No pain or stiffness  RESPIRATORY: No cough, wheezing, chills or hemoptysis; No Shortness of Breath  CARDIOVASCULAR: No chest pain, palpitations, passing out, dizziness, or leg swelling  GASTROINTESTINAL: No abdominal or epigastric pain. No nausea, vomiting, or hematemesis; No diarrhea or constipation. No melena or hematochezia.  GENITOURINARY: No dysuria, frequency, hematuria, or incontinence  NEUROLOGICAL: No headaches, memory loss, loss of strength, numbness, or tremors  SKIN: No itching, burning, rashes, or lesions   LYMPH Nodes: No enlarged glands  ENDOCRINE: No heat or cold intolerance; No hair loss  MUSCULOSKELETAL: No joint pain or swelling; No muscle, back, or extremity pain  PSYCHIATRIC: No depression, anxiety, mood swings, or difficulty sleeping  HEME/LYMPH: No easy bruising, or bleeding gums  ALLERGY AND IMMUNOLOGIC: No hives or eczema	    [ ] All others negative	  [ ] Unable to obtain    PHYSICAL EXAM:  T(C): 37.7 (05-07-20 @ 04:53), Max: 37.7 (05-07-20 @ 04:53)  HR: 72 (05-07-20 @ 04:53) (66 - 74)  BP: 155/81 (05-07-20 @ 04:53) (153/81 - 168/79)  RR: 18 (05-07-20 @ 04:53) (18 - 18)  SpO2: 95% (05-07-20 @ 04:53) (95% - 98%)  Wt(kg): --  I&O's Summary    06 May 2020 07:01  -  07 May 2020 07:00  --------------------------------------------------------  IN: 680 mL / OUT: 1050 mL / NET: -370 mL        Appearance: Normal	  HEENT:   Normal oral mucosa, PERRL, EOMI	  Lymphatic: No lymphadenopathy  Cardiovascular: Normal S1 S2, No JVD, + murmurs, No edema  Respiratory: Lungs clear to auscultation	  Psychiatry: A & O x 3, Mood & affect appropriate  Gastrointestinal:  Soft, Non-tender, + BS	  Skin: No rashes, No ecchymoses, No cyanosis	  Neurologic: Non-focal  Extremities: Normal range of motion, No clubbing, cyanosis or edema  Vascular: Peripheral pulses palpable 2+ bilaterally    MEDICATIONS  (STANDING):  ATENolol  Tablet 25 milliGRAM(s) Oral daily  carbamide peroxide Otic Solution 4 Drop(s) Both Ears two times a day  cephalexin 250 milliGRAM(s) Oral every 12 hours  enoxaparin Injectable 40 milliGRAM(s) SubCutaneous daily  lisinopril 5 milliGRAM(s) Oral two times a day  meclizine 12.5 milliGRAM(s) Oral two times a day  senna 2 Tablet(s) Oral at bedtime  sodium chloride 0.9%. 1000 milliLiter(s) (50 mL/Hr) IV Continuous <Continuous>      TELEMETRY: 	    ECG:  	  RADIOLOGY:  OTHER: 	  	  LABS:	 	    CARDIAC MARKERS:            05-07    135  |  102  |  15  ----------------------------<  132<H>  4.1   |  19<L>  |  0.83    Ca    10.3      07 May 2020 06:54      proBNP:   Lipid Profile:   HgA1c:   TSH:         Assessment and plan  ---------------------------  pt is well known to me from the last admission.  80 female, Hx: HTN (Atenolol); with recent syncopal workup 1 month prior for a previous fall . Presents 3 days after a fall on her face, reports she was feeling dizzy prior to falling, have trouble pronouncing words/findings words. Denies any preceding CP, SOB, nausea, vomiting, diarrhea, constipation, bloody stools, or dysuric symptoms. Today she presents with poorly localized abdominal pain and nausea X1 day.,  since  resolved  Denies any recent fevers; denies prior hx of CVA or MI, PE or DVT.   pt with multiple syncopal episode since last admission   last admission HCTZ  was dcd  pt with known hx of cad, non orthostatic on exam  echo noted  physical therapy, instability on her gait  dvt prophylaxis  asa daily  increase Lisinopril to bid, bp is better controlled, will adjust meds accordingly

## 2020-05-07 NOTE — PROGRESS NOTE ADULT - SUBJECTIVE AND OBJECTIVE BOX
afebrile/  pt stable  REVIEW OF SYSTEMS:  GEN: no fever,    no chills  RESP: no SOB,   no cough  CVS: no chest pain,   no palpitations  GI: no abdominal pain,   no nausea,   no vomiting,   no constipation,   no diarrhea  : no dysuria,   no frequency  NEURO: no headache,   no dizziness  PSYCH: no depression,   not anxious  Derm : no rash    MEDICATIONS  (STANDING):  ATENolol  Tablet 25 milliGRAM(s) Oral daily  carbamide peroxide Otic Solution 4 Drop(s) Both Ears two times a day  cephalexin 250 milliGRAM(s) Oral every 12 hours  enoxaparin Injectable 40 milliGRAM(s) SubCutaneous daily  lisinopril 5 milliGRAM(s) Oral two times a day  meclizine 12.5 milliGRAM(s) Oral two times a day  senna 2 Tablet(s) Oral at bedtime  sodium chloride 0.9%. 1000 milliLiter(s) (50 mL/Hr) IV Continuous <Continuous>    MEDICATIONS  (PRN):      Vital Signs Last 24 Hrs  T(C): 37.7 (07 May 2020 04:53), Max: 37.7 (07 May 2020 04:53)  T(F): 99.9 (07 May 2020 04:53), Max: 99.9 (07 May 2020 04:53)  HR: 72 (07 May 2020 04:53) (66 - 74)  BP: 155/81 (07 May 2020 04:53) (153/81 - 168/79)  BP(mean): --  RR: 18 (07 May 2020 04:53) (18 - 18)  SpO2: 95% (07 May 2020 04:53) (95% - 98%)  CAPILLARY BLOOD GLUCOSE        I&O's Summary    06 May 2020 07:01  -  07 May 2020 07:00  --------------------------------------------------------  IN: 680 mL / OUT: 1050 mL / NET: -370 mL        PHYSICAL EXAM:  HEAD:  Atraumatic, Normocephalic  NECK: Supple, No   JVD  CHEST/LUNG:   no     rales,     no,    rhonchi  HEART: Regular rate and rhythm;         murmur  ABDOMEN: Soft, Nontender, ;   EXTREMITIES:    no    edema  NEUROLOGY:  alert    LABS:    05-07    135  |  102  |  15  ----------------------------<  132<H>  4.1   |  19<L>  |  0.83    Ca    10.3      07 May 2020 06:54                              Consultant(s) Notes Reviewed:      Care Discussed with Consultants/Other Providers:

## 2020-05-07 NOTE — SPEECH LANGUAGE PATHOLOGY EVALUATION - SLP REPETITION
phrase/sentence/impaired/Pt presents with errors at increased length suspected due to decreased working memory

## 2020-05-07 NOTE — SPEECH LANGUAGE PATHOLOGY EVALUATION - SLP AUTOMATIC SPEECH
impaired/Pt counted "1... 2... shmmmm... 20." However, suspected due to decreased cooperativeness. Other automatic speech was fully in tact only with reduced articulatory precision/counting

## 2020-05-07 NOTE — SPEECH LANGUAGE PATHOLOGY EVALUATION - SLP DIAGNOSIS
Pt is being seen for bedside speech-language evaluation per patient  request and patient complaints of reduced word finding and changes in speech production in the setting of balance difficulties/recent falls. Pt presents with imprecise articulation, noted weakness and decreased coordination between respiration/phonation suspected due to present dysarthria. Speech is suspected to be further impacted by absent dentures per patient reports. Cognitive linguistic deficits noted for reduced capacities for attention with high distractibility, tangential conversation, as well as impaired working memory and short term memory.

## 2020-05-07 NOTE — SPEECH LANGUAGE PATHOLOGY EVALUATION - SLP BEHAVIORAL OBSERVATIONS
distractible/Mildly somnolent with intermittent sundowning during exam. Pt was observed to "stone and puff" in response to verbal directive showed moderate irritibality versus fatigued easily.

## 2020-05-07 NOTE — SPEECH LANGUAGE PATHOLOGY EVALUATION - DISCOURSE
impaired/Utterances were shortened suspected due to fatigue. Patient showed disinterest to engage. Turns were intermittently tangential.

## 2020-05-07 NOTE — SPEECH LANGUAGE PATHOLOGY EVALUATION - COMMENTS
Per Internal Medicine note 5/7, "now  admitted with h/o dizziness, and  a  fall, 3 days prior to admission  CT  head,  no bleed/stroke, h/o  unsteady gait and   falls/pt  with poor  balance/sarcopenia and OA  able  to  raise  b/l  arms/  legs  and  normal  speech/ pt usually yells  at staff   ct  head,  no    bleed,    cxr,  normal/    x ray ,    knee/  pelvis, no  fx    orthostatics ,  negative  mild  hyponatremia  and   mild  hypercalcemia, on iv  fluids.  ,now  PT  eval ,  await  disposition  and   bp  meds, per card  echo,  normal  ef.  seen  by card  uti  with proteus, on  keflex fo  3  days/ pt has no dysuria/  allergic  to  cipro  on 5/5,  called    pt  relations.  complaining /for   ent  to  remove  ear  wax/ as  he thinks , reason for  rptd  falls  spoke  with .,   yesterday, he  wanted  pt home,  but,   today  ,he  wants  her in " rehab with extra  PT"  he  will  speak  with care cortn/  he  wants  a guarantee, that   pt will never fall  per  PT, pt with decreased  strength/ poor  balance  and  poor  balance  control  seen by  ent,  minimal  cerumen  follow  bmp in am, if  stable, then will  clear  for   d/c  to  rehab  per  rn, that  pt  has   been refusing  some  of  her meds/  spoke with  pts  pmd, dr susan townsend,/ agrees   with  plan  of  care/ had  called pmd.    now    is  demanding a  speech  therapist,  for  a  h/o  chronic  mild  speech  impediment, that  he  states, only   he  has  noted, / pt is  edentulous    pt has  left  her  dentures  at  home,  which can  affect  her  speech  this  am, pt   was  able  to  clearly  states  her  wishes, in presence  of  her nurse" Per Internal Medicine note 5/7, "now admitted with h/o dizziness, and a fall, 3 days prior to admission. CT  head, no bleed/stroke, h/o unsteady gait and falls/pt  with poor  balance/sarcopenia and OA, able to raise b/l arms/legs and normal speech/ pt usually yells at staff. ct  head, no bleed, cxr,  normal/x ray, knee/pelvis, no fx    orthostatics ,  negative  mild  hyponatremia  and   mild  hypercalcemia, on iv  fluids.  ,now  PT  eval ,  await  disposition  and   bp  meds, per card  echo,  normal  ef.  seen  by card  uti  with proteus, on  keflex fo  3  days/ pt has no dysuria/  allergic  to  cipro  on 5/5,  called    pt  relations.  complaining /for   ent  to  remove  ear  wax/ as  he thinks , reason for  rptd  falls  spoke  with .,   yesterday, he  wanted  pt home,  but,   today  ,he  wants  her in " rehab with extra  PT"  he  will  speak  with care cortn/  he  wants  a guarantee, that   pt will never fall  per  PT, pt with decreased  strength/ poor  balance  and  poor  balance  control  seen by  ent,  minimal  cerumen  follow  bmp in am, if  stable, then will  clear  for   d/c  to  rehab. per rn, pt has been refusing some of her meds/spoke with pts pmd, dr susan townsend agrees with plan of care/ had called pmd. now  is demanding a speech  therapist, for a h/o chronic mild speech impediment, that  he states, only he has noted/pt is edentulous. pt has left her dentures at home, which can affect her speech this am, pt was able to clearly states her  wishes, in presence of her nurse"

## 2020-05-07 NOTE — CHART NOTE - NSCHARTNOTEFT_GEN_A_CORE
Patient cleared for discharge by Dr. Arce. CM spoke with patients . As per Dr. Arce, she called  today and  stated "why are you calling me today, I didn't ask to speak to you".  made aware of speech therapist evaluation and said "thank you for your interest" and hung up of phone.

## 2020-05-08 ENCOUNTER — RESULT REVIEW (OUTPATIENT)
Age: 80
End: 2020-05-08

## 2021-06-25 NOTE — ED PROVIDER NOTE - NS ED MD DISPO DISCHARGE CCDA
Refusing refill as a duplicate request.  Marilou Ellington RN  [facility]  
Patient/Caregiver provided printed discharge information.

## 2022-04-11 NOTE — ED ADULT NURSE NOTE - PRO INTERPRETER NEED 2
Refill Routing Note   Medication(s) are not appropriate for processing by [facility] for the following reason(s):      - Non-participating provider    [facility] action(s):  Route          Medication reconciliation completed: No     Appointments  past 12m or future 3m with PCP    Date Provider   Last Visit   Visit date not found LANI Rosales   Next Visit   Visit date not found LANI Rosales   ED visits in past 90 days: 1        Note composed:7:13 AM 04/11/2022           
English

## 2024-01-12 NOTE — PROGRESS NOTE ADULT - SUBJECTIVE AND OBJECTIVE BOX
CARDIOLOGY     PROGRESS  NOTE   ________________________________________________    CHIEF COMPLAINT:Patient is a 79y old  Female who presents with a chief complaint of left hip pain, fall (30 Mar 2020 10:27)  doing better.  	  REVIEW OF SYSTEMS:  CONSTITUTIONAL: No fever, weight loss, or fatigue  EYES: No eye pain, visual disturbances, or discharge  ENT:  No difficulty hearing, tinnitus, vertigo; No sinus or throat pain  NECK: No pain or stiffness  RESPIRATORY: No cough, wheezing, chills or hemoptysis; No Shortness of Breath  CARDIOVASCULAR: No chest pain, palpitations, passing out, dizziness, or leg swelling  GASTROINTESTINAL: No abdominal or epigastric pain. No nausea, vomiting, or hematemesis; No diarrhea or constipation. No melena or hematochezia.  GENITOURINARY: No dysuria, frequency, hematuria, or incontinence  NEUROLOGICAL: No headaches, memory loss, loss of strength, numbness, or tremors  SKIN: No itching, burning, rashes, or lesions   LYMPH Nodes: No enlarged glands  ENDOCRINE: No heat or cold intolerance; No hair loss  MUSCULOSKELETAL: No joint pain or swelling; No muscle, back, or extremity pain  PSYCHIATRIC: No depression, anxiety, mood swings, or difficulty sleeping  HEME/LYMPH: No easy bruising, or bleeding gums  ALLERGY AND IMMUNOLOGIC: No hives or eczema	    [ ] All others negative	  [ ] Unable to obtain    PHYSICAL EXAM:  T(C): 37.4 (03-30-20 @ 06:10), Max: 37.7 (03-29-20 @ 17:06)  HR: 82 (03-30-20 @ 06:10) (71 - 82)  BP: 137/78 (03-30-20 @ 06:10) (134/81 - 144/70)  RR: 18 (03-30-20 @ 06:10) (18 - 18)  SpO2: 95% (03-30-20 @ 06:10) (95% - 100%)  Wt(kg): --  I&O's Summary    29 Mar 2020 07:01  -  30 Mar 2020 07:00  --------------------------------------------------------  IN: 1020 mL / OUT: 1050 mL / NET: -30 mL        Appearance: Normal	  HEENT:   Normal oral mucosa, PERRL, EOMI	  Lymphatic: No lymphadenopathy  Cardiovascular: Normal S1 S2, No JVD, + murmurs, No edema  Respiratory: Lungs clear to auscultation	  Psychiatry: A & O x 3, Mood & affect appropriate  Gastrointestinal:  Soft, Non-tender, + BS	  Skin: No rashes, No ecchymoses, No cyanosis	  Neurologic: Non-focal  Extremities: Normal range of motion, No clubbing, cyanosis or edema  Vascular: Peripheral pulses palpable 2+ bilaterally    MEDICATIONS  (STANDING):  ATENolol  Tablet 25 milliGRAM(s) Oral daily  enoxaparin Injectable 40 milliGRAM(s) SubCutaneous daily  loratadine 10 milliGRAM(s) Oral at bedtime  nystatin Powder 1 Application(s) Topical two times a day  polyethylene glycol 3350 17 Gram(s) Oral daily  senna 2 Tablet(s) Oral at bedtime      TELEMETRY: 	    ECG:  	  RADIOLOGY:  OTHER: 	  	  LABS:	 	    CARDIAC MARKERS:                                11.2   5.20  )-----------( 148      ( 28 Mar 2020 11:01 )             35.1         TPro  6.4  /  Alb  3.3  /  TBili  0.7  /  DBili  0.1  /  AST  47<H>  /  ALT  51<H>  /  AlkPhos  40  03-29    proBNP:   Lipid Profile:   HgA1c:   TSH:     < from: 12 Lead ECG (03.28.20 @ 11:45) >  Diagnosis Line NORMAL SINUS RHYTHM  NORMAL ECG        Assessment and plan  ---------------------------  79F c hx HTN, right hip replacement, pw 1 month of dry cough, and fall c/b left hip pain and inability to ambulate.  Pt states she has had a dry cough for about 1 month, but was otherwise in her usual state of health until 3 days ago, when she was walking in her home and suddenly fell backwards. Pt isn't sure what caused the fall, but pt denies any pre-syncope and LOC at the time. Pt states she fell onto her left hip, and pt immediately called out to her , who helped pt to the bed. Pt states she was unable to get up on her own at that time and has not been able to ambulate since then. Pt denies head trauma. Pt states she did not know she had a fever until arriving in the Ed, where she noticed she was also having chills. Other ROS as below.  pt with known hx of htn ?cad, s/p fall , ?syncope.  pt with no hx of arrythmia but is been  taking few meds for bp control, including HCTZ.  no chest pain.  s/p fall syncope  dc all bp meds including HCTZ  chest ct, covid negative  dvt prophylaxis  ecg noted  asa daily  may dc from cardiac stand point FAMILY HISTORY:  Mother  Still living? Unknown  FH: smoking, Age at diagnosis: Age Unknown
